# Patient Record
Sex: FEMALE | Race: WHITE
[De-identification: names, ages, dates, MRNs, and addresses within clinical notes are randomized per-mention and may not be internally consistent; named-entity substitution may affect disease eponyms.]

---

## 2017-11-30 ENCOUNTER — HOSPITAL ENCOUNTER (OUTPATIENT)
Dept: HOSPITAL 80 - FIMAGING | Age: 47
End: 2017-11-30
Attending: INTERNAL MEDICINE
Payer: COMMERCIAL

## 2017-11-30 DIAGNOSIS — Z12.31: Primary | ICD-10-CM

## 2017-11-30 DIAGNOSIS — Z80.3: ICD-10-CM

## 2018-11-28 ENCOUNTER — HOSPITAL ENCOUNTER (INPATIENT)
Dept: HOSPITAL 80 - FED | Age: 48
LOS: 7 days | Discharge: HOME HEALTH | DRG: 478 | End: 2018-12-05
Attending: INTERNAL MEDICINE | Admitting: INTERNAL MEDICINE
Payer: COMMERCIAL

## 2018-11-28 DIAGNOSIS — R50.9: ICD-10-CM

## 2018-11-28 DIAGNOSIS — E86.9: ICD-10-CM

## 2018-11-28 DIAGNOSIS — Z23: ICD-10-CM

## 2018-11-28 DIAGNOSIS — Z85.3: ICD-10-CM

## 2018-11-28 DIAGNOSIS — C79.51: ICD-10-CM

## 2018-11-28 DIAGNOSIS — G89.3: ICD-10-CM

## 2018-11-28 DIAGNOSIS — M84.552A: Primary | ICD-10-CM

## 2018-11-28 LAB
INR PPP: 1.05 (ref 0.83–1.16)
PLATELET # BLD: 374 10^3/UL (ref 150–400)
PROTHROMBIN TIME: 13.9 SEC (ref 12–15)

## 2018-11-28 PROCEDURE — G0378 HOSPITAL OBSERVATION PER HR: HCPCS

## 2018-11-28 PROCEDURE — A9585 GADOBUTROL INJECTION: HCPCS

## 2018-11-28 PROCEDURE — G0008 ADMIN INFLUENZA VIRUS VAC: HCPCS

## 2018-11-28 NOTE — EDPHY
General





- History


Smoking Status: Never smoked


Time Seen by Provider: 11/28/18 20:50


Narrative: 





CHIEF COMPLAINT: 


Rib cage pain, groin pain








HISTORY OF PRESENT ILLNESS: 


Patient presents to emergency department by private vehicle with her spouse 

with complaints of left rib pain and left groin pain.  Pain has been present 

for over 2 weeks.  She was doing exercises for left groin injury when she felt 

a sudden onset of pain in the muscles over her left side of her ribs.  It has 

been constant duration.  Moderate to severe at times, increasing severity over 

the past few days.  Worse with inspiration and palpation.  Also worse with 

movement at times.  She has no shortness of breath, cough, fever or any chest 

pain.  She has ongoing pain of the left groin as well.  She has been evaluated 

only by physical therapist without physician evaluation or imaging.  She has no 

extremity erythema edema or pain.  She has been taking Advil 400 mg 3-4 times 

daily with some improvement.  No history of venous thrombolic event.  No other 

associated complaints or modifying factors.





REVIEW OF SYSTEMS:


10 systems were reviewed and negative with the exception of the elements 

mentioned in the history of present illness.





PCP:


Dr. Radha Gerard





SPECIALISTS:


oncology, Dr. Iza Mays





PAST MEDICAL HISTORY: 


Ductal carcinoma in situ breast cancer.





PAST SURGICAL HISTORY:


Left mastectomy.





SOCIAL HISTORY:


Never smoker.  Lives independently with her spouse.





FAMILY HISTORY:


Noncontributory





EXAMINATION:


Vitals: Triage VS reviewed


General Appearance:  Alert, no distress.  Anxious.  Well-appearing.


Head: normocephalic, atraumatic


Eyes:  Pupils equal and round, no conjunctival pallor or injection


ENT, Mouth:  Mucous membranes moist


Neck:  Normal inspection, supple, non-tender


Respiratory:  Lungs are clear to auscultation.  Tenderness palpation on the 

left anterior axillary line over the lower ribs.


Cardiovascular:  Regular rate and rhythm


Gastrointestinal:  Abdomen is soft and nontender


Back: non-tender, no bony abnormalities


Neurological:  A&O, nonfocal, normal gait


Skin:  Warm and dry, no rash


Extremities:  Tenderness of the left greater trochanter left inguinal canal.  

There is no bony tenderness of the left ankle, heel, shin, knee.  Range of 

motion lower extremities symmetric painful in the left hip.  All compartments 

are soft the left lower extremity.


Psychiatric:  Mood and affect normal





DIFFERENTIAL DIAGNOSES:


Including but not limited to costochondritis, muscular strain, muscular strain, 

PE, pneumonitis, pathologic fracture, metastasis, pneumonia, pleurisy, 

pericarditis, hemothorax, pleural effusion








MDM:


8:50 p.m.


Left-sided reproducible rib pain and left groin and hip pain of 2 weeks 

duration.  Worsening over the past few days.  Pain does seem to be reproducible 

but is somewhat pleuritic.  Given her history of cancer and current use of 

tamoxifen, I discussed with Dr. Gomez, we will order D-dimer to rule out the 

possibility of PE.  Patient's spouse are comfortable this plan.  She is taking 

her own ibuprofen that she brought with her, 600 mg. She is taking her own 

Ativan that she brought with her, 1 mg by mouth.





9:55 p.m.


Chest x-ray as read by me reveals fluid collection versus patchy infiltrate on 

left portion of the chest.





10:00 p.m.


D-dimer is slightly elevated 0.53, thus I have ordered CT angiography of the 

chest.  I have re-evaluated the patient she has consented to the scan.





10:05 p.m.


Notified by radiologist Dr. Elmore.  There are patchy abnormalities of the 

chest bilaterally.  I notified him that we will be obtaining a CT scan of the 

chest for further evaluation.





10:10 p.m.


Notified by radiologist Dr. Elmore.  He notes some possible lytic lesions of 

the left proximal femur and possibly left acetabulum.  Recommend CT scan for 

further evaluation at this time.





10:45 p.m.


Notified by radiologist.  CT scans of the chest and pelvis were discussed.  

There is no PE but there are extensive metastatic changes as documented.  There 

is also a pathologic fracture of the left femoral neck.





11:13 p.m.


Case discussed with Dr. Cohn. he will admit the patient to his service.  

He request laboratory studies as I have ordered.  I will also consult 

Orthopedics.





12:05 a.m.


Orthopedic consultation pending.





12:15 a.m.


The orthopedist has been reached in the operating room.  They will return our 

call following the current case.





12:25 a.m.


Case discussed with Dr. Arechiga. He will provide consultation. No surgical 

intervention emergently.





SUPERVISION:


Patient was independently examined, but I discussed the case with my secondary 

supervising physician Dr. Gomez








CONSULTATION:


Hospitalist admission, Dr. Cohn


Orthopedics pending with Dr. Arechiga.


 (Max Matute)





- Diagnostics


Imaging Results: 


 Imaging Impressions





Chest X-Ray  11/28/18 21:13


Impression:


1. Bilateral mid and lower lobe interstitial opacities suggesting lymphangitic 

spread of tumor.


2. No pneumothorax.


3.  Fracture of indeterminate age left 6th rib mid axillary line.


 


Findings and recommendations discussed with Emergency Department Physician 

Assistant, Max Matute PA-C, at 2209 hours, on November 28, 2018.


 


Final report concurs with initial preliminary interpretation.








Hip X-Ray  11/28/18 21:13


Impression:


1.  Lytic lucencies involving the left acetabulum and proximal left femur, 

consistent with metastasis.


2. Left femoral neck fracture is only identified on the CT pelvis.


 


Findings and recommendations discussed with Emergency Department physician, 

Max Matute PA-C, at 2213 hours, on November 28, 2018.


 


Final report concurs with initial preliminary interpretation.








Chest/Thorax CTA  11/28/18 22:00


Impression:


1. Diffuse lytic osseous metastasis.


2. Bilateral diffuse lymphangitic spread of tumor.


3. No pulmonary embolus. 


 


 


 


Findings and recommendations discussed with Emergency Department physician, 

Max Matute PAC  at  22:40 hour, 11/28/2018.


 


Final report concurs with initial preliminary interpretation.


 


 








Pelvis CT  11/28/18 22:11


Impression:


1. Diffuse lytic osseous metastasis.


2. Pathologic fracture of the left femoral neck.


3. L5-S1 spondylolytic grade 1 spondylolisthesis. 


 


 


 


Findings and recommendations given to Max Matute PAC  at  22:50 hour, 11/28/ 2018.


 


Final report concurs with initial preliminary interpretation.














- Objective


Vital Signs: 


 Initial Vital Signs











Temperature (C)  98.4 F   11/28/18 20:22


 


Heart Rate  83   11/28/18 20:22


 


Respiratory Rate  20   11/28/18 20:22


 


Blood Pressure  163/101 H  11/28/18 20:22


 


O2 Sat (%)  98   11/28/18 20:22








 











O2 Delivery Mode               Room Air














Allergies/Adverse Reactions: 


 





No Known Allergies Allergy (Unverified 11/28/18 20:20)


 








Home Medications: 














 Medication  Instructions  Recorded


 


LORazepam [Ativan 0.5 mg (RX)] 0.5 mg PO DAILY 09/17/12


 


Tamoxifen Citrate  11/28/18











Laboratory Results: 


 Laboratory Results





 11/28/18 21:30 





 11/28/18 21:30 





 











  11/28/18 11/28/18 11/28/18





  21:35 21:30 21:30


 


WBC      10.65 10^3/uL H 10^3/uL





     (3.80-9.50) 


 


RBC      4.21 10^6/uL 10^6/uL





     (4.18-5.33) 


 


Hgb      13.0 g/dL g/dL





     (12.6-16.3) 


 


POC Hgb  12.9 gm/dL gm/dL    





   (12.6-16.3)   


 


Hct      36.6 % L %





     (38.0-47.0) 


 


POC Hct  38 % %    





   (38-47)   


 


MCV      86.9 fL fL





     (81.5-99.8) 


 


MCH      30.9 pg pg





     (27.9-34.1) 


 


MCHC      35.5 g/dL g/dL





     (32.4-36.7) 


 


RDW      11.9 % %





     (11.5-15.2) 


 


Plt Count      374 10^3/uL 10^3/uL





     (150-400) 


 


MPV      9.5 fL fL





     (8.7-11.7) 


 


Neut % (Auto)      73.5 % %





     (39.3-74.2) 


 


Lymph % (Auto)      18.2 % %





     (15.0-45.0) 


 


Mono % (Auto)      6.8 % %





     (4.5-13.0) 


 


Eos % (Auto)      0.8 % %





     (0.6-7.6) 


 


Baso % (Auto)      0.4 % %





     (0.3-1.7) 


 


Nucleat RBC Rel Count      0.0 % %





     (0.0-0.2) 


 


Absolute Neuts (auto)      7.83 10^3/uL H 10^3/uL





     (1.70-6.50) 


 


Absolute Lymphs (auto)      1.94 10^3/uL 10^3/uL





     (1.00-3.00) 


 


Absolute Monos (auto)      0.72 10^3/uL 10^3/uL





     (0.30-0.80) 


 


Absolute Eos (auto)      0.09 10^3/uL 10^3/uL





     (0.03-0.40) 


 


Absolute Basos (auto)      0.04 10^3/uL 10^3/uL





     (0.02-0.10) 


 


Absolute Nucleated RBC      0.00 10^3/uL 10^3/uL





     (0-0.01) 


 


Immature Gran %      0.3 % %





     (0.0-1.1) 


 


Immature Gran #      0.03 10^3/uL 10^3/uL





     (0.00-0.10) 


 


PT      





    


 


INR      





    


 


APTT      





    


 


D-Dimer      





    


 


POC Sodium  143 mEq/L mEq/L    





   (135-145)   


 


Sodium    139 mEq/L mEq/L  





    (135-145)  


 


POC Potassium  3.4 mEq/L mEq/L    





   (3.3-5.0)   


 


Potassium    3.7 mEq/L mEq/L  





    (3.3-5.0)  


 


POC Chloride  110 mEq/L mEq/L    





   ()   


 


Chloride    107 mEq/L mEq/L  





    ()  


 


Carbon Dioxide    20 mEq/l L mEq/l  





    (22-31)  


 


Anion Gap    12 mEq/L mEq/L  





    (6-14)  


 


POC BUN  16 mg/dL mg/dL    





   (7-23)   


 


BUN    17 mg/dL mg/dL  





    (7-23)  


 


Creatinine    0.8 mg/dL mg/dL  





    (0.6-1.0)  


 


POC Creatinine  0.8 mg/dL mg/dL    





   (0.6-1.0)   


 


Estimated GFR    > 60   





    


 


Glucose    127 mg/dL H mg/dL  





    ()  


 


POC Glucose  132 mg/dL H mg/dL    





   ()   


 


Calcium    10.4 mg/dL mg/dL  





    (8.5-10.4)  


 


Total Bilirubin    0.4 mg/dL mg/dL  





    (0.1-1.4)  


 


Conjugated Bilirubin    0.2 mg/dL mg/dL  





    (0.0-0.5)  


 


Unconjugated Bilirubin    0.2 mg/dL mg/dL  





    (0.0-1.1)  


 


AST    45 IU/L IU/L  





    (14-46)  


 


ALT    30 IU/L IU/L  





    (9-52)  


 


Alkaline Phosphatase    86 IU/L IU/L  





    ()  


 


Total Protein    8.4 g/dL H g/dL  





    (6.3-8.2)  


 


Albumin    4.9 g/dL g/dL  





    (3.5-5.0)  














  11/28/18





  21:30


 


WBC  





  


 


RBC  





  


 


Hgb  





  


 


POC Hgb  





  


 


Hct  





  


 


POC Hct  





  


 


MCV  





  


 


MCH  





  


 


MCHC  





  


 


RDW  





  


 


Plt Count  





  


 


MPV  





  


 


Neut % (Auto)  





  


 


Lymph % (Auto)  





  


 


Mono % (Auto)  





  


 


Eos % (Auto)  





  


 


Baso % (Auto)  





  


 


Nucleat RBC Rel Count  





  


 


Absolute Neuts (auto)  





  


 


Absolute Lymphs (auto)  





  


 


Absolute Monos (auto)  





  


 


Absolute Eos (auto)  





  


 


Absolute Basos (auto)  





  


 


Absolute Nucleated RBC  





  


 


Immature Gran %  





  


 


Immature Gran #  





  


 


PT  13.9 SEC SEC





   (12.0-15.0) 


 


INR  1.05 





   (0.83-1.16) 


 


APTT  28.1 SEC SEC





   (23.0-38.0) 


 


D-Dimer  0.53 ug/mLFEU H ug/mLFEU





   (0.00-0.50) 


 


POC Sodium  





  


 


Sodium  





  


 


POC Potassium  





  


 


Potassium  





  


 


POC Chloride  





  


 


Chloride  





  


 


Carbon Dioxide  





  


 


Anion Gap  





  


 


POC BUN  





  


 


BUN  





  


 


Creatinine  





  


 


POC Creatinine  





  


 


Estimated GFR  





  


 


Glucose  





  


 


POC Glucose  





  


 


Calcium  





  


 


Total Bilirubin  





  


 


Conjugated Bilirubin  





  


 


Unconjugated Bilirubin  





  


 


AST  





  


 


ALT  





  


 


Alkaline Phosphatase  





  


 


Total Protein  





  


 


Albumin  





  











Medications Given: 


 








Discontinued Medications





Sodium Chloride (Ns)  1,000 mls @ 0 mls/hr IV EDNOW ONE; Wide Open


   PRN Reason: Protocol


   Stop: 11/28/18 22:02


   Last Admin: 11/28/18 22:53 Dose:  1,000 mls





Point of Care Test Results: 


 Chemistry











  11/28/18





  21:35


 


POC Sodium  143 mEq/L mEq/L





   (135-145) 


 


POC Potassium  3.4 mEq/L mEq/L





   (3.3-5.0) 


 


POC Chloride  110 mEq/L mEq/L





   () 


 


POC BUN  16 mg/dL mg/dL





   (7-23) 


 


POC Creatinine  0.8 mg/dL mg/dL





   (0.6-1.0) 


 


POC Glucose  132 mg/dL H mg/dL





   () 








 ISTAT H&H











  11/28/18





  21:35


 


POC Hgb  12.9 gm/dL gm/dL





   (12.6-16.3) 


 


POC Hct  38 % %





   (38-47) 














Departure





- Departure


Disposition: [facility] Inpatient Acute


Clinical Impression: 


 Mass of left lung, Mass of right lung





Fracture of femoral neck, left, closed


Qualifiers:


 Encounter type: initial encounter Qualified Code(s): S72.002A - Fracture of 

unspecified part of neck of left femur, initial encounter for closed fracture





Condition: Fair

## 2018-11-29 LAB — PLATELET # BLD: 323 10^3/UL (ref 150–400)

## 2018-11-29 RX ADMIN — DEXTROSE MONOHYDRATE, SODIUM CHLORIDE, AND POTASSIUM CHLORIDE SCH MLS: 50; 4.5; 1.49 INJECTION, SOLUTION INTRAVENOUS at 00:58

## 2018-11-29 RX ADMIN — DEXTROSE MONOHYDRATE, SODIUM CHLORIDE, AND POTASSIUM CHLORIDE SCH MLS: 50; 4.5; 1.49 INJECTION, SOLUTION INTRAVENOUS at 10:29

## 2018-11-29 RX ADMIN — IBUPROFEN PRN MG: 600 TABLET ORAL at 08:14

## 2018-11-29 RX ADMIN — IBUPROFEN PRN MG: 600 TABLET ORAL at 14:15

## 2018-11-29 NOTE — HOSPPROG
Hospitalist Progress Note


Assessment/Plan: 


47 yo F w/ hx of breast CA presents with L hip pain and found to have 

widespread metastases and pathologic L hip fracture.





Plan: 


1. Breast CA, recurrent - Presented with various MSK complaints; imaging 

revealed widespread osseous metastases and likely lymphangitic spread in the 

lungs. She was initially diagnosed with DCIS in 2009 and treated with 

lumpectomy and radiation. She had Stage I disease recurrence in 2012 treated 

with mastectomy and chemotherapy. She follows with Dr. Iza Mays.


- Oncology consulted and following 


- Morphine PRN for pain control, Ativan PRN for anxiety





2. Pathologic L femoral neck fracture, acute - L hip pain was her presenting 

symptom. This will likely be a complex situation noting the extent of malignant 

involvement.


- Orthopedic surgery (Dr. Arechiga) consulted who recommends Orthopaedic Oncology 

consult


- Dr. Espinosa of Oncology spoke with Dr. Hernandez, Orthopaedic Oncologist at , 

who recommends pain control and seeing how mobile patient is, if she is unable 

to mobilize or has intractable pain he recommends transfer to  for further 

evaluation, however he believes patient would likely require total hip 

replacement and with her cancer burden he believes the morbidity from the 

procedure would be high


- Pain control as above 





Diet - Regular


Code - Full


Ppx - SCDs pending surgical evaluation, high risk for VTE so would commence 

LMWH afterwards





Dispo - Pending clinical course 





Objective: 


 Vital Signs











Temp Pulse Resp BP Pulse Ox


 


 36.5 C   79   14   115/72   96 


 


 11/29/18 15:26  11/29/18 15:26  11/29/18 15:26  11/29/18 15:26  11/29/18 15:26








 Laboratory Results





 11/29/18 05:35 





 11/29/18 05:35 





 











 11/28/18 11/29/18 11/30/18





 05:59 05:59 05:59


 


Intake Total  452 950


 


Output Total   1450


 


Balance  452 -500








 











PT  13.9 SEC (12.0-15.0)   11/28/18  21:30    


 


INR  1.05  (0.83-1.16)   11/28/18  21:30    














- Physical Exam


Constitutional: no apparent distress


Eyes: PERRL


Ears, Nose, Mouth, Throat: moist mucous membranes


Cardiovascular: regular rate and rhythym


Respiratory: no respiratory distress


Gastrointestinal: soft, non-tender abdomen


Skin: warm


Musculoskeletal: pain with ROM


Neurologic: AAOx3


Psychiatric: interacting appropriately





ICD10 Worksheet


Patient Problems: 


 Problems











Problem Status Onset


 


Fracture of femoral neck, left, closed Acute  


 


Mass of left lung Acute  


 


Mass of right lung Acute

## 2018-11-29 NOTE — PDMN
Medical Necessity


Medical necessity: MCG: [facility] Oncology  HX of Br. Ca with new Dg diffuse osseous 

metastatic disease- new pathologic fx of hip with major instability - pt with 

ortho consults , onc consult, PT, OT, ongoing monitoring, eval and tx  status 

changed to INPT 11/29/19

## 2018-11-29 NOTE — GCON
DATE OF CONSULTATION:  11/28/2018



REASON FOR CONSULTATION:  Left hip pain.



HISTORY RELATIVE TO CONSULTATION:  The patient is a 48-year-old with a history of breast CA who has h
ad a progressive onset of left hip pain over the last 6 months.  She was just on a prolonged internat
ional vacation that involved lots of walking and has had exacerbation of her symptoms over the last c
ouple of weeks.  Her pain is currently limiting her from doing some walking.  She started using a can
e for ambulation.



PHYSICAL EXAMINATION:  MUSCULOSKELETAL: There is no focal deformity with her hip.  Her leg lengths ar
e symmetric with symmetrical rotation.  Gentle hip flexion and extension does not produce any signifi
cant pain but with more loaded motion, pain is reproduced in the groin.



IMAGING DATA:  The images including AP radiographs of her hip and CT scan were reviewed.  There is ex
tensive metastatic disease throughout her pelvis, spine, and femur.  Most notably in the left femoral
 head, there is a large lytic lesion which appears to extend out the periphery of the head.



ASSESSMENT:  Left pathologic femoral head fracture.



PLAN:  Based on the extensive nature of the lesion in her femoral head, from a treatment standpoint, 
a total hip arthroplasty is the likely preferable option.  Based on her extensive metastatic disease,
 it was recommended that she be managed by an orthopedic oncologist.  She was given contact informati
on for Dr. Shaun Tatum at the [facility] and will call tomorrow to set up an appoint
ment.  She will continue crutch ambulation and may be discharged when her pain level allows.





Job #:  [id number]

## 2018-11-29 NOTE — PDGENHP
History and Physical





- Chief Complaint


L hip pain, chest pain





- History of Present Illness


47 yo F w/ hx of breast CA presents with L hip pain and chest pain. The patient 

has been having occasional musculoskeletal pains for several months. Over the 

last 3 weeks, L hip pain has led to difficulty ambulating. Over the last few 

days she has also been noticing anterior chest wall pain. She presented to the 

ED today for evaluation. In the ED CT scans were notable for diffuse osseous 

metastases including a possible pathologic fracture of the L femoral neck. 





She was initially diagnosed with DCIS in 2009 and treated with lumpectomy and 

radiation at that time. She had Stage I disease recurrence in 2012 and was 

treated with mastectomy and chemotherapy. She has been on Tamoxifen since. She 

followed with Dr. Iza Mays.





Case discussed with ED physician Dr. Coello; records reviewed and summarized 

above.





History Information





- Allergies/Home Medication List


Allergies/Adverse Reactions: 








No Known Allergies Allergy (Unverified 11/28/18 20:20)


 





Home Medications: 








LORazepam [Ativan 0.5 mg (RX)] 0.5 mg PO DAILY 09/17/12 [Last Taken Unknown]


Tamoxifen Citrate  11/28/18 [Last Taken Unknown]





I have personally reviewed and updated: family history, medical history





- Past Medical History


cancer





- Surgical History


Reports: cancer surgery (Lumpectomy 2009, mastectomy 2012)





- Family History


Negative for: cancer





- Social History


Smoking Status: Never smoked





Review of Systems


Review of Systems: 





ROS: 10pt was reviewed & negative except for what was stated in HPI & below





Physical Exam


Physical Exam: 

















Temp Pulse Resp BP Pulse Ox


 


 36.9 C   64   16   128/75 H  95 


 


 11/28/18 20:22  11/28/18 22:56  11/28/18 22:56  11/28/18 22:56  11/28/18 22:56











Constitutional: no apparent distress, appears nourished


Eyes: PERRL, EOMI


Ears, Nose, Mouth, Throat: moist mucous membranes, no oral mucosal ulcers


Cardiovascular: regular rate and rhythym, no murmur, rub, or gallop


Respiratory: no respiratory distress, clear to auscultation


Gastrointestinal: normoactive bowel sounds, soft, non-tender abdomen


Skin: warm, normal color


Musculoskeletal: full muscle strength, pain with ROM (L hip)


Neurologic: AAOx3, CN II-XII Intact


Psychiatric: interacting appropriately, anxious





Lab Data & Imaging Review





 11/28/18 21:30





 11/28/18 21:30














WBC  10.65 10^3/uL (3.80-9.50)  H  11/28/18  21:30    


 


RBC  4.21 10^6/uL (4.18-5.33)   11/28/18  21:30    


 


Hgb  13.0 g/dL (12.6-16.3)   11/28/18  21:30    


 


POC Hgb  12.9 gm/dL (12.6-16.3)   11/28/18  21:35    


 


Hct  36.6 % (38.0-47.0)  L  11/28/18  21:30    


 


POC Hct  38 % (38-47)   11/28/18  21:35    


 


MCV  86.9 fL (81.5-99.8)   11/28/18  21:30    


 


MCH  30.9 pg (27.9-34.1)   11/28/18  21:30    


 


MCHC  35.5 g/dL (32.4-36.7)   11/28/18  21:30    


 


RDW  11.9 % (11.5-15.2)   11/28/18  21:30    


 


Plt Count  374 10^3/uL (150-400)   11/28/18  21:30    


 


MPV  9.5 fL (8.7-11.7)   11/28/18  21:30    


 


Neut % (Auto)  73.5 % (39.3-74.2)   11/28/18  21:30    


 


Lymph % (Auto)  18.2 % (15.0-45.0)   11/28/18  21:30    


 


Mono % (Auto)  6.8 % (4.5-13.0)   11/28/18  21:30    


 


Eos % (Auto)  0.8 % (0.6-7.6)   11/28/18  21:30    


 


Baso % (Auto)  0.4 % (0.3-1.7)   11/28/18 21:30    


 


Nucleat RBC Rel Count  0.0 % (0.0-0.2)   11/28/18  21:30    


 


Absolute Neuts (auto)  7.83 10^3/uL (1.70-6.50)  H  11/28/18  21:30    


 


Absolute Lymphs (auto)  1.94 10^3/uL (1.00-3.00)   11/28/18  21:30    


 


Absolute Monos (auto)  0.72 10^3/uL (0.30-0.80)   11/28/18  21:30    


 


Absolute Eos (auto)  0.09 10^3/uL (0.03-0.40)   11/28/18  21:30    


 


Absolute Basos (auto)  0.04 10^3/uL (0.02-0.10)   11/28/18  21:30    


 


Absolute Nucleated RBC  0.00 10^3/uL (0-0.01)   11/28/18  21:30    


 


Immature Gran %  0.3 % (0.0-1.1)   11/28/18  21:30    


 


Immature Gran #  0.03 10^3/uL (0.00-0.10)   11/28/18  21:30    


 


PT  13.9 SEC (12.0-15.0)   11/28/18  21:30    


 


INR  1.05  (0.83-1.16)   11/28/18  21:30    


 


APTT  28.1 SEC (23.0-38.0)   11/28/18  21:30    


 


D-Dimer  0.53 ug/mLFEU (0.00-0.50)  H  11/28/18  21:30    


 


POC Sodium  143 mEq/L (135-145)   11/28/18  21:35    


 


Sodium  139 mEq/L (135-145)   11/28/18  21:30    


 


POC Potassium  3.4 mEq/L (3.3-5.0)   11/28/18  21:35    


 


Potassium  3.7 mEq/L (3.3-5.0)   11/28/18  21:30    


 


POC Chloride  110 mEq/L ()   11/28/18  21:35    


 


Chloride  107 mEq/L ()   11/28/18  21:30    


 


Carbon Dioxide  20 mEq/l (22-31)  L  11/28/18  21:30    


 


Anion Gap  12 mEq/L (6-14)   11/28/18  21:30    


 


POC BUN  16 mg/dL (7-23)   11/28/18  21:35    


 


BUN  17 mg/dL (7-23)   11/28/18  21:30    


 


Creatinine  0.8 mg/dL (0.6-1.0)   11/28/18  21:30    


 


POC Creatinine  0.8 mg/dL (0.6-1.0)   11/28/18  21:35    


 


Estimated GFR  > 60   11/28/18  21:30    


 


Glucose  127 mg/dL ()  H  11/28/18  21:30    


 


POC Glucose  132 mg/dL ()  H  11/28/18  21:35    


 


Calcium  10.4 mg/dL (8.5-10.4)   11/28/18  21:30    


 


Total Bilirubin  0.4 mg/dL (0.1-1.4)   11/28/18  21:30    


 


Conjugated Bilirubin  0.2 mg/dL (0.0-0.5)   11/28/18  21:30    


 


Unconjugated Bilirubin  0.2 mg/dL (0.0-1.1)   11/28/18  21:30    


 


AST  45 IU/L (14-46)   11/28/18  21:30    


 


ALT  30 IU/L (9-52)   11/28/18  21:30    


 


Alkaline Phosphatase  86 IU/L ()   11/28/18  21:30    


 


Total Protein  8.4 g/dL (6.3-8.2)  H  11/28/18  21:30    


 


Albumin  4.9 g/dL (3.5-5.0)   11/28/18  21:30    








Imaging Review: 





 Imaging Impressions





Chest X-Ray  11/28/18 21:13


Impression:


1. Bilateral mid and lower lobe interstitial opacities suggesting lymphangitic 

spread of tumor.


2. No pneumothorax.


3.  Fracture of indeterminate age left 6th rib mid axillary line.


 


Findings and recommendations discussed with Emergency Department Physician 

Assistant, Max Matute PA-C, at 2209 hours, on November 28, 2018.


 


Final report concurs with initial preliminary interpretation.








Hip X-Ray  11/28/18 21:13


Impression:


1.  Lytic lucencies involving the left acetabulum and proximal left femur, 

consistent with metastasis.


2. Left femoral neck fracture is only identified on the CT pelvis.


 


Findings and recommendations discussed with Emergency Department physician, 

Max Matute PA-C, at 2213 hours, on November 28, 2018.


 


Final report concurs with initial preliminary interpretation.








Chest/Thorax CTA  11/28/18 22:00


Impression:


1. Diffuse lytic osseous metastasis.


2. Bilateral diffuse lymphangitic spread of tumor.


3. No pulmonary embolus. 


 


 


 


Findings and recommendations discussed with Emergency Department physician, 

Max Matute PAC  at  22:40 hour, 11/28/2018.


 


Final report concurs with initial preliminary interpretation.


 


 








Pelvis CT  11/28/18 22:11


Impression:


1. Diffuse lytic osseous metastasis.


2. Pathologic fracture of the left femoral neck.


3. L5-S1 spondylolytic grade 1 spondylolisthesis. 


 


 


 


Findings and recommendations given to Max Matute PAC  at  22:50 hour, 11/28/ 2018.


 


Final report concurs with initial preliminary interpretation.














Assessment & Plan


Assessment: 








47 yo F w/ hx of breast CA presents with L hip pain and found to have 

widespread metastases and pathologic L hip fracture.








Plan: 


1. Breast CA, recurrent - Presented with various MSK complaints; imaging 

revealed widespread osseous metastases and likely lymphangitic spread in the 

lungs. She was initially diagnosed with DCIS in 2009 and treated with 

lumpectomy and radiation. She had Stage I disease recurrence in 2012 treated 

with mastectomy and chemotherapy. She follows with Dr. Iza Mays.


- Admit for observation


- Oncology consult in the morning


- Morphine PRN for pain control, Ativan PRN for anxiety


- May benefit from palliative involvement as well


2. Pathologic L femoral neck fracture, acute - L hip pain was her presenting 

symptom. This will likely be a complex situation noting the extent of malignant 

involvement.


- Orthopedic surgery (Dr. Arechiga) consulted


- Will maintain NPO pending surgical evaluation





Diet - NPO pending surgical evaluation


Code - Full


Ppx - SCDs pending surgical evaluation, high risk for VTE so would commence 

LMWH afterwards


Dispo - Admit under observation status

## 2018-11-30 RX ADMIN — IBUPROFEN PRN MG: 600 TABLET ORAL at 03:18

## 2018-11-30 RX ADMIN — IBUPROFEN PRN MG: 600 TABLET ORAL at 09:32

## 2018-11-30 RX ADMIN — IBUPROFEN PRN MG: 600 TABLET ORAL at 21:39

## 2018-11-30 RX ADMIN — IBUPROFEN PRN MG: 600 TABLET ORAL at 15:48

## 2018-11-30 NOTE — SOAPPROG
SOAP Progress Note


Assessment/Plan: 


Assessment/Plan: 48 woman w hx of T1cN0 ER+CO-HER2 neg breast cancer in 2012 s/

p L mastectomy and SLNB


This was followed by TC x 6 and she has been on tamoxifen since that time


She presented w L hip pain discovered to have femoral neck fracture w diffuse 

osseous metastatic disease, presumably metastatic breast ca


1. L fem neck fracture - not amenable to surgery at this time given extensive 

lytic lesions


D/W DR Hernandez at [facility] - would requite total hip arthroplasty which could be quite 

morbid for her and no guarantee of helping; would like to hold off on surgery 

at this time so we can get system therapy started


Pain controlled very well and can ambulate w walker


pelvic plain film complete - will need to see DR Hernandez as outpt


as a means to achieve some bone reconstitution, consulted RadOnc - was 

simulated and given high fraction today (thnk one treatment only planned but 

did not see Dr Perry's note)


Can probably safely get Zometa tomorrow prior to discharge


PT/OT


2. Diffuse metastatic dz - mets to lungs (lymphangitic spread?), liver (3 mets) 

and skeleton


soft tissue dx recommended for definitive path and molecular testing


Have requested CT guided IR bx for tomorrow


CA 27-29 pending


If ER+ breast cancer, treatment of choice would be first line AI or fulvestrant 

w Ck4/6 inhibitor + bisphosphonate


Gave her re-assurance today


3. Pain - home w oxycodone





more than 45 min spent w pt and family








11/30/18 17:01





Subjective: 





No acute events


XRT today


Pain controlled


Objective: 





 Vital Signs











Temp Pulse Resp BP Pulse Ox


 


 37.1 C   98   16   125/74 H  97 


 


 11/30/18 14:35  11/30/18 14:35  11/30/18 14:35  11/30/18 14:35  11/30/18 14:35








 











 11/29/18 11/30/18 12/01/18





 05:59 05:59 05:59


 


Intake Total  3497 565


 


Output Total  2300 2250


 


Balance  237 -1685








 











PT  13.9 SEC (12.0-15.0)   11/28/18  21:30    


 


INR  1.05  (0.83-1.16)   11/28/18  21:30    








Sitting in chair


Not examined today





ICD10 Worksheet


Patient Problems: 


 Problems











Problem Status Onset


 


Fracture of femoral neck, left, closed Acute  


 


Mass of left lung Acute  


 


Mass of right lung Acute

## 2018-11-30 NOTE — HOSPPROG
Hospitalist Progress Note


Assessment/Plan: 


47 yo F w/ hx of breast CA presents with L hip pain and found to have 

widespread metastases and pathologic L hip fracture.





Plan: 


1. Breast CA, recurrent - Presented with various MSK complaints; imaging 

revealed widespread osseous metastases and likely lymphangitic spread in the 

lungs. She was initially diagnosed with DCIS in 2009 and treated with 

lumpectomy and radiation. She had Stage I disease recurrence in 2012 treated 

with mastectomy and chemotherapy. She follows with Dr. Iza Mays.


- Oncology consulted and following 


- Morphine PRN for pain control, Ativan PRN for anxiety


- Abd CT shows liver, lungs, thoracolumbar metastasis


- Oncology recommending tissue biopsy, considering Liver Lesion biopsy, will 

make NPO at midnight





2. Pathologic L femoral neck fracture, acute - L hip pain was her presenting 

symptom. This will likely be a complex situation noting the extent of malignant 

involvement.


- Orthopedic surgery (Dr. Arechiga) consulted who recommends Orthopaedic Oncology 

consult


- Dr. Espinosa of Oncology spoke with Dr. Hernandez, Orthopaedic Oncologist at , 

who recommends pain control and seeing how mobile patient is, if she is unable 

to mobilize or has intractable pain he recommends transfer to  for further 

evaluation, however he believes patient would likely require total hip 

replacement and with her cancer burden he believes the morbidity from the 

procedure would be high


- Pain control as above 





Diet - Regular


Code - Full


Ppx - SCDs pending surgical evaluation, high risk for VTE so would commence 

LMWH afterwards





Dispo - Pending clinical course, likely d/c tomorrow 





Subjective: Patient reports some pain in LLE today


Objective: 


 Vital Signs











Temp Pulse Resp BP Pulse Ox


 


 37.1 C   98   16   125/74 H  97 


 


 11/30/18 14:35  11/30/18 14:35  11/30/18 14:35  11/30/18 14:35  11/30/18 14:35








 











 11/29/18 11/30/18 12/01/18





 05:59 05:59 05:59


 


Intake Total  2537 565


 


Output Total  2300 1300


 


Balance  237 -735








 











PT  13.9 SEC (12.0-15.0)   11/28/18  21:30    


 


INR  1.05  (0.83-1.16)   11/28/18  21:30    














- Physical Exam


Constitutional: uncomfortable


Eyes: PERRL


Ears, Nose, Mouth, Throat: moist mucous membranes


Cardiovascular: regular rate and rhythym


Respiratory: no respiratory distress


Gastrointestinal: soft, non-tender abdomen


Genitourinary: No bedoya in urethra


Musculoskeletal: pain with ROM


Neurologic: AAOx3


Psychiatric: interacting appropriately





ICD10 Worksheet


Patient Problems: 


 Problems











Problem Status Onset


 


Fracture of femoral neck, left, closed Acute  


 


Mass of left lung Acute  


 


Mass of right lung Acute

## 2018-11-30 NOTE — ASMTCMCOM
CM Note

 

CM Note                       

Notes:

Pt admitted for metastatic cancer to bone with femur fracture. PT and OT recommending home 

care. Palliative eval orders placed today at end of day. Pt agreeable to home therapy through [facility] 

and they are able to accept. 



CM also mentioned that palliative orders had been placed and pt became tearful. CM did some 

appropriate education on the function of palliative care and told pt that  would be 

available to chat with her over the weekend about what palliative care is and how it may be helpful 


and what agencies are available. 



Family and friends were in the room and expressed overwhelm at number of decisions to be made. CM 

explained that decisions regarding support were only needed in the timing that was comfortable for 

the pt, and that the RN Navigator was a great resource when they were ready. It may be appropriate 

for  to touch base with pt over the weekend still for support and listening. 



D/C Plan: Home with [facility] PT/OT

 

Date Signed:  11/30/2018 04:27 PM

Electronically Signed By:Oksana Olivera

## 2018-12-01 RX ADMIN — IBUPROFEN PRN MG: 600 TABLET ORAL at 06:20

## 2018-12-01 RX ADMIN — DEXTROSE MONOHYDRATE, SODIUM CHLORIDE, AND POTASSIUM CHLORIDE SCH MLS: 50; 4.5; 1.49 INJECTION, SOLUTION INTRAVENOUS at 12:10

## 2018-12-01 RX ADMIN — IBUPROFEN PRN MG: 600 TABLET ORAL at 12:16

## 2018-12-01 RX ADMIN — IBUPROFEN PRN MG: 600 TABLET ORAL at 17:49

## 2018-12-01 NOTE — HOSPPROG
Hospitalist Progress Note


Assessment/Plan: 


47 yo F w/ hx of breast CA presents with L hip pain and found to have 

widespread metastases and pathologic L hip fracture.





Plan: 


1. Breast CA, recurrent - Presented with various MSK complaints; imaging 

revealed widespread osseous metastases and likely lymphangitic spread in the 

lungs. She was initially diagnosed with DCIS in 2009 and treated with 

lumpectomy and radiation. She had Stage I disease recurrence in 2012 treated 

with mastectomy and chemotherapy. She follows with Dr. Iza Mays.


- Oncology consulted and following 


- Morphine PRN for pain control, Ativan PRN for anxiety


- Abd CT shows liver, lungs, thoracolumbar metastasis


- Oncology recommending liver lesion biopsy, ordered for today but unable to be 

done per IR, will either perform as IP vs. OP on Monday 





2. Pathologic L femoral neck fracture, acute - L hip pain was her presenting 

symptom. This will likely be a complex situation noting the extent of malignant 

involvement.


- Orthopedic surgery (Dr. Arechiga) consulted who recommends Orthopaedic Oncology 

consult, patient to f/u with Dr. Hernandez, Orthopaedic Oncologist at , with 

repeat imaging as an outpatient 


- Pain control as above 





Diet - Regular


Code - Full


Ppx - SCDs pending surgical evaluation, high risk for VTE so would commence 

LMWH afterwards





Dispo - Pending clinical course





Subjective: Patient reports mild pain in LLE this AM


Objective: 


 Vital Signs











Temp Pulse Resp BP Pulse Ox


 


 36.9 C   68   14   130/77 H  95 


 


 12/01/18 11:09  12/01/18 11:09  12/01/18 11:09  12/01/18 11:09  12/01/18 11:09








 











 11/30/18 12/01/18 12/02/18





 05:59 05:59 05:59


 


Intake Total 2537 3205 


 


Output Total 2300 4500 


 


Balance 237 -1295 








 











PT  13.9 SEC (12.0-15.0)   11/28/18  21:30    


 


INR  1.05  (0.83-1.16)   11/28/18  21:30    














- Physical Exam


Constitutional: no apparent distress


Eyes: PERRL


Ears, Nose, Mouth, Throat: moist mucous membranes


Cardiovascular: No edema


Respiratory: no respiratory distress


Gastrointestinal: No distension


Musculoskeletal: pain with ROM


Neurologic: AAOx3


Psychiatric: interacting appropriately





ICD10 Worksheet


Patient Problems: 


 Problems











Problem Status Onset


 


Fracture of femoral neck, left, closed Acute  


 


Mass of left lung Acute  


 


Mass of right lung Acute

## 2018-12-01 NOTE — SOAPPROG
SOAP Progress Note


Assessment/Plan: 


Assessment:





SOAP Progress Note


Assessment/Plan: 


Assessment/Plan: 48 woman w hx of T1cN0 ER+NM-HER2 neg breast cancer in 2012 s/

p L mastectomy and SLNB


This was followed by TC x 6 and she has been on tamoxifen since that time


She presented w L hip pain discovered to have femoral neck fracture w diffuse 

osseous metastatic disease, presumably metastatic breast ca


1. L fem neck fracture - not amenable to surgery at this time given extensive 

lytic lesions


Pain controlled. Can ambulate w walker (non weight bearing) will need to see DR Hernandez as outpt


Was simulated and given one high fraction yesterday per Dr. Perry. 


Zometa today. 


PT/OT


2. Diffuse metastatic dz - mets to lungs (lymphangitic spread?), liver (3 mets) 

and skeleton


soft tissue dx recommended for definitive path and molecular testing


 CT guided IR bx of liver lesion. 


CA 27-29 elevated consistent with metastatic breast cancer.


If ER+ breast cancer, treatment of choice would be first line AI or fulvestrant 

w Ck4/6 inhibitor + bisphosphonate.





3. Pain - home w oxycodone











Subjective: 





comfortable, intermittently tearful


Objective: 





 Vital Signs











Temp Pulse Resp BP Pulse Ox


 


 36.9 C   68   14   130/77 H  95 


 


 12/01/18 11:09  12/01/18 11:09 12/01/18 11:09 12/01/18 11:09 12/01/18 11:09 11/30/18 12/01/18 12/02/18





 05:59 05:59 05:59


 


Intake Total 2537 3205 


 


Output Total 2300 4500 


 


Balance 237 -1295 








 











PT  13.9 SEC (12.0-15.0)   11/28/18  21:30    


 


INR  1.05  (0.83-1.16)   11/28/18  21:30    














Physical Exam





- Physical Exam


General Appearance: no apparent distress





ICD10 Worksheet


Patient Problems: 


 Problems











Problem Status Onset


 


Fracture of femoral neck, left, closed Acute  


 


Mass of left lung Acute  


 


Mass of right lung Acute

## 2018-12-02 LAB — PLATELET # BLD: 322 10^3/UL (ref 150–400)

## 2018-12-02 RX ADMIN — AZITHROMYCIN MONOHYDRATE SCH MLS: 500 INJECTION, POWDER, LYOPHILIZED, FOR SOLUTION INTRAVENOUS at 21:16

## 2018-12-02 RX ADMIN — IBUPROFEN PRN MG: 600 TABLET ORAL at 10:14

## 2018-12-02 RX ADMIN — DEXTROSE MONOHYDRATE, SODIUM CHLORIDE, AND POTASSIUM CHLORIDE SCH MLS: 50; 4.5; 1.49 INJECTION, SOLUTION INTRAVENOUS at 05:25

## 2018-12-02 RX ADMIN — IBUPROFEN PRN MG: 600 TABLET ORAL at 18:33

## 2018-12-02 NOTE — HOSPPROG
Hospitalist Progress Note


Assessment/Plan: 


49 yo F w/ hx of breast CA presents with L hip pain and found to have 

widespread metastases and pathologic L hip fracture.





Plan: 


1. Breast CA, recurrent - Presented with various MSK complaints; imaging 

revealed widespread osseous metastases and likely lymphangitic spread in the 

lungs. She was initially diagnosed with DCIS in 2009 and treated with 

lumpectomy and radiation. She had Stage I disease recurrence in 2012 treated 

with mastectomy and chemotherapy. She follows with Dr. Iza Mays.


- Oncology consulted and following 


- Morphine PRN for pain control, Ativan PRN for anxiety


- Abd CT shows liver, lungs, thoracolumbar metastasis


- MRI Brain ordered for today


- Bone biopsy of L ilium ordered for tomorrow for tissue diagnosis per Onc 


- Lupron planned for tomorrow


- PET as an outpatient 





2. Pathologic L femoral neck fracture, acute - L hip pain was her presenting 

symptom. This will likely be a complex situation noting the extent of malignant 

involvement.


- Orthopedic surgery (Dr. Arechiga) consulted who recommends Orthopaedic Oncology 

consult, patient to f/u with Dr. Hernandez, Orthopaedic Oncologist at , with 

repeat imaging as an outpatient 


- Pain control as above 


- Was simulated and given one high fraction on Friday by Dr. Perry


- S/p Zometa yesterday per Onc





3. Fever 


- T max 38.2 overnight with slight tachycardia


- CXR performed which showed worsening pneumonitis, potentially viral


- Afebrile so far today, HR improved


- Will hold off on abx for now, continue to monitor, if spikes another fever, 

will start empiric abx





Diet - Regular


Code - Full


Ppx - SCDs pending surgical evaluation, high risk for VTE so would commence 

LMWH afterwards





Dispo - Pending clinical course





Subjective: Patient reports some pain in LLE


Objective: 


 Vital Signs











Temp Pulse Resp BP Pulse Ox


 


 36.8 C   93   14   109/66   92 


 


 12/02/18 08:00  12/02/18 08:00  12/02/18 08:00  12/02/18 08:00  12/02/18 08:00








 Laboratory Results





 12/02/18 05:51 





 











 12/01/18 12/02/18 12/03/18





 05:59 05:59 05:59


 


Intake Total 3205 1550 


 


Output Total 4500 1700 


 


Balance -1295 -150 








 











PT  13.9 SEC (12.0-15.0)   11/28/18  21:30    


 


INR  1.05  (0.83-1.16)   11/28/18  21:30    














- Physical Exam


Constitutional: no apparent distress


Eyes: PERRL


Ears, Nose, Mouth, Throat: moist mucous membranes


Cardiovascular: regular rate and rhythym


Respiratory: no respiratory distress


Skin: warm


Musculoskeletal: pain with ROM


Neurologic: AAOx3


Psychiatric: interacting appropriately





ICD10 Worksheet


Patient Problems: 


 Problems











Problem Status Onset


 


Fracture of femoral neck, left, closed Acute  


 


Mass of left lung Acute  


 


Mass of right lung Acute

## 2018-12-02 NOTE — SOAPPROG
SOAP Progress Note


Assessment/Plan: 


Assessment:





SOAP Progress Note


Assessment/Plan: 


Assessment/Plan: 48 woman w hx of T1cN0 ER+MD-HER2 neg breast cancer in 2012 s/

p L mastectomy and SLNB


This was followed by TC x 6 and she has been on tamoxifen since that time


She presented w L hip pain discovered to have femoral neck fracture w diffuse 

osseous metastatic disease, presumably metastatic breast ca





1. L fem neck fracture - not amenable to surgery at this time given extensive 

lytic lesions


Pain controlled. Can ambulate w walker (non weight bearing) will need to see DR Hernandez as outpt


Was simulated and given one high fraction on Friday by Dr. Perry. 





2. Left rib pain-films reviewed with Dr. doe. Has bone mets in lateral 8, 10

, 11 ribs. Suspect radiation would be of benefit. Will discuss with Dr. Perry. 





2. Diffuse metastatic dz - mets to lungs (lymphangitic spread?), liver (3 mets) 

and skeleton


soft tissue dx recommended for definitive path and molecular testing. Reviewed 

CT scans today with Dr. Doe. Liver biopsy is challenging, lesion in the 

dome of the liver. Easily accessible lesion in left ileum with prominent soft 

tissue component. Preference is to obtain soft tissue sample (as opposed to bone

) due to potential issues with false neg results on immunostains with the 

decalcification process. Will discuss with pathology. Will send for foundation 

testing, along with ER, MD and Her2. Received Zometa on 12/1. Treatment will 

likely be antiestrogen therapy with CDK inhibitor. Chemotherapy would be a 

consideration if lung disease proves to be lymphangitic and has progressive 

resp compromise. Will get brain MRI today, PET as an outpatient. Lupron tomorrow

, anticipating probable antiestrogen approach. 





3. Low grade fever last night-unclear source. Can sometimes see after zometa. 

CXR with increased markings suggestive of viral/atypical pneumonitis. Will hold 

on antibiotics for now. patient cultured.





4. ? Lymphangitic disease-CT/CXR's reviewed with Dr. Doe. He favors more 

viral process. Difficult to know for sure. Not hypoxic. Will monitor for now. 























12/02/18 10:11





Subjective: 





left ribs are painful. Limits movement.


Appropriately intermittently tearful


Objective: 





 Vital Signs











Temp Pulse Resp BP Pulse Ox


 


 36.8 C   93   14   109/66   92 


 


 12/02/18 08:00  12/02/18 08:00  12/02/18 08:00  12/02/18 08:00  12/02/18 08:00








 Laboratory Results





 12/02/18 05:51 





 











 12/01/18 12/02/18 12/03/18





 05:59 05:59 05:59


 


Intake Total 3205 1550 


 


Output Total 4500 1700 


 


Balance -1295 -150 








 











PT  13.9 SEC (12.0-15.0)   11/28/18  21:30    


 


INR  1.05  (0.83-1.16)   11/28/18  21:30    














Physical Exam





- Physical Exam


General Appearance: alert, mild distress


Respiratory: lungs clear


Abdomen: non-tender, soft


Lymphatic: no adenopathy


Neuro/Psych: alert





ICD10 Worksheet


Patient Problems: 


 Problems











Problem Status Onset


 


Fracture of femoral neck, left, closed Acute  


 


Mass of left lung Acute  


 


Mass of right lung Acute

## 2018-12-03 LAB — PLATELET # BLD: 246 10^3/UL (ref 150–400)

## 2018-12-03 PROCEDURE — 0QB33ZX EXCISION OF LEFT PELVIC BONE, PERCUTANEOUS APPROACH, DIAGNOSTIC: ICD-10-PCS | Performed by: GENERAL ACUTE CARE HOSPITAL

## 2018-12-03 RX ADMIN — DEXTROSE MONOHYDRATE, SODIUM CHLORIDE, AND POTASSIUM CHLORIDE SCH MLS: 50; 4.5; 1.49 INJECTION, SOLUTION INTRAVENOUS at 07:04

## 2018-12-03 RX ADMIN — AZITHROMYCIN MONOHYDRATE SCH MLS: 500 INJECTION, POWDER, LYOPHILIZED, FOR SOLUTION INTRAVENOUS at 09:31

## 2018-12-03 RX ADMIN — ENOXAPARIN SODIUM SCH MG: 100 INJECTION SUBCUTANEOUS at 14:48

## 2018-12-03 NOTE — SOAPPROG
SOAP Progress Note


Assessment/Plan: 





Assessment/Plan: 48 woman w hx of T1cN0 ER+RI-HER2 neg breast cancer in 2012 s/

p L mastectomy and SLNB


This was followed by TC x 6 and she has been on tamoxifen since that time who 

presented w L hip pain discovered to have femoral neck fracture w diffuse 

osseous metastatic disease, presumably metastatic breast ca





1. L fem neck fracture - not amenable to surgery at this time given extensive 

lytic lesions


Pain controlled. Can ambulate w walker (non weight bearing) will need to see DR Hernandez as outpt


Was simulated and given one high fraction on Friday by Dr. Perry. 





2. Left rib pain-films reviewed with Dr. burgos. Has bone mets in lateral 8, 10

, 11 ribs. Suspect radiation would be of benefit. Discussed with Dr. Melendez, 

will get XRT as outpatient 





2. Diffuse metastatic dz - mets to lungs (lymphangitic spread?), liver (3 mets)

, skeleton and brain


soft tissue dx recommended for definitive path and molecular testing. Reviewed 

CT scans today with Dr. Burgos. Easily accessible lesion in left ileum with 

prominent soft tissue component. Preference is to obtain soft tissue sample (as 

opposed to bone) due to potential issues with false neg results on immunostains 

with the decalcification process. Will send for foundation testing, along with 

ER, RI and Her2. Received Zometa on 12/1. Treatment will likely be antiestrogen 

therapy with CDK inhibitor. Chemotherapy would be a consideration if lung 

disease proves to be lymphangitic and has progressive resp compromise. -Lupron 

tomorrow, PET as an outpatient. 





3. Low grade fever last night-unclear source.?Viral process vs tumor fever





4. ? Lymphangitic disease-CT/CXR's reviewed with Dr. Burgos, consideration for 

viral pneumonitis. Not hypoxemic but febrile. will monitor for now, no 

indication for frontline chemotherapy 














Subjective: 





Patient continues to have pain in her ribs - febrile 12/2 at 1600 to 38.3. No 

localizing symptoms. NPO for biopsy today.


Objective: 





 Vital Signs











Temp Pulse Resp BP Pulse Ox


 


 37.0 C   83   18   108/69   94 


 


 12/03/18 20:01  12/03/18 20:01  12/03/18 20:01  12/03/18 20:01  12/03/18 20:01








 Microbiology











 12/02/18 20:22 Respiratory Panel (PCR) - Final





 Nasal, Sinus - Swab    No Organism Detected By Pcr








 Laboratory Results





 12/03/18 04:42 





 











 12/02/18 12/03/18 12/04/18





 05:59 05:59 05:59


 


Intake Total 1434 417 7372


 


Output Total 1700 700 


 


Balance -150 -100 1330








 











PT  13.9 SEC (12.0-15.0)   11/28/18  21:30    


 


INR  1.05  (0.83-1.16)   11/28/18  21:30    














ICD10 Worksheet


Patient Problems: 


 Problems











Problem Status Onset


 


Fracture of femoral neck, left, closed Acute  


 


Mass of left lung Acute  


 


Mass of right lung Acute

## 2018-12-03 NOTE — PDRADPN
Radiology Procedure Note


Date of Procedure: 12/03/18


Radiologist: Dennis Pérez


Anesthesia: IV Sedation


Pre-op Diagnosis: breast cancer


Post-op Diagnosis: same


Procedure: CT-guided left ischial tuberosity lesio biopsy


Inf/Abcess present in the surg proc area at time of surgery?: No

## 2018-12-03 NOTE — HOSPPROG
Hospitalist Progress Note


Assessment/Plan: 


49 yo F w/ hx of breast CA presents with L hip pain and found to have 

widespread metastases and pathologic L hip fracture.





Plan: 


1. Breast CA, recurrent - Presented with various MSK complaints; imaging 

revealed widespread osseous metastases and likely lymphangitic spread in the 

lungs. She was initially diagnosed with DCIS in 2009 and treated with 

lumpectomy and radiation. She had Stage I disease recurrence in 2012 treated 

with mastectomy and chemotherapy. She follows with Dr. Iza Mays.


- Oncology consulted and following 


- Morphine PRN for pain control, Ativan PRN for anxiety


- Abd CT shows liver, lungs, thoracolumbar metastasis


- MRI Brain performed on 12/2 which showed 6 small intraparenchymal lesions 

concerning for brain metastasis 


- Bone biopsy of L ilium performed this morning 


- Lupron planned for today


- PET as an outpatient 





2. Pathologic L femoral neck fracture, acute - L hip pain was her presenting 

symptom. This will likely be a complex situation noting the extent of malignant 

involvement.


- Orthopedic surgery (Dr. Arechiga) consulted who recommends Orthopaedic Oncology 

consult, patient to f/u with Dr. Hernandez, Orthopaedic Oncologist at , with 

repeat imaging as an outpatient 


- Pain control as above 


- Was simulated and given one high fraction on Friday by Dr. Perry


- S/p Zometa on 12/per Onc





3. Fever 


- Febrile again overnight


- CXR performed on 12/1 which showed worsening pneumonitis, potentially viral


- Started on Ceftriaxone/Azithromycin by overnight MD, will continue for now





Diet - Regular


Code - Full


Ppx - SCDs pending surgical evaluation, high risk for VTE so would commence 

LMWH afterwards





Dispo - Pending clinical course, possible tomorrow 





Subjective: Patient upset this morning about NPO status, with dry mouth


Objective: 


 Vital Signs











Temp Pulse Resp BP Pulse Ox


 


 37.3 C   87   14   111/66   93 


 


 12/03/18 11:46  12/03/18 11:46  12/03/18 11:46  12/03/18 13:36  12/03/18 13:36








 Microbiology











 12/02/18 20:22 Respiratory Panel (PCR) - Final





 Nasal, Sinus - Swab    No Organism Detected By Pcr








 Laboratory Results





 12/03/18 04:42 





 











 12/02/18 12/03/18 12/04/18





 05:59 05:59 05:59


 


Intake Total 1550 600 30


 


Output Total 1700 700 


 


Balance -150 -100 30








 











PT  13.9 SEC (12.0-15.0)   11/28/18  21:30    


 


INR  1.05  (0.83-1.16)   11/28/18  21:30    














- Physical Exam


Constitutional: uncomfortable


Eyes: PERRL


Ears, Nose, Mouth, Throat: dry mucous membranes


Cardiovascular: regular rate and rhythym


Respiratory: no respiratory distress


Gastrointestinal: soft, non-tender abdomen


Skin: warm


Musculoskeletal: pain with ROM


Neurologic: AAOx3


Psychiatric: anxious





ICD10 Worksheet


Patient Problems: 


 Problems











Problem Status Onset


 


Fracture of femoral neck, left, closed Acute  


 


Mass of left lung Acute  


 


Mass of right lung Acute

## 2018-12-03 NOTE — PDPROPOC
Sedation Plan of Care


ASA Classification: ASA 3


Mallampati Score: Class 2


Mallampati Reference Image:

## 2018-12-04 PROCEDURE — 3E0T33Z INTRODUCTION OF ANTI-INFLAMMATORY INTO PERIPHERAL NERVES AND PLEXI, PERCUTANEOUS APPROACH: ICD-10-PCS | Performed by: RADIOLOGY

## 2018-12-04 PROCEDURE — 3E0T3BZ INTRODUCTION OF ANESTHETIC AGENT INTO PERIPHERAL NERVES AND PLEXI, PERCUTANEOUS APPROACH: ICD-10-PCS | Performed by: RADIOLOGY

## 2018-12-04 RX ADMIN — AZITHROMYCIN MONOHYDRATE SCH MLS: 500 INJECTION, POWDER, LYOPHILIZED, FOR SOLUTION INTRAVENOUS at 09:32

## 2018-12-04 RX ADMIN — MORPHINE SULFATE SCH MG: 15 TABLET, FILM COATED, EXTENDED RELEASE ORAL at 12:55

## 2018-12-04 RX ADMIN — MORPHINE SULFATE SCH MG: 15 TABLET, FILM COATED, EXTENDED RELEASE ORAL at 20:47

## 2018-12-04 RX ADMIN — ENOXAPARIN SODIUM SCH MG: 100 INJECTION SUBCUTANEOUS at 08:39

## 2018-12-04 NOTE — HOSPPROG
Hospitalist Progress Note


Assessment/Plan: 





Assessment/Plan: 


49 yo F w/ hx of breast CA presents with L hip pain and found to have 

widespread metastases and pathologic L hip fracture.





Plan: 


# Breast CA, recurrent -  Diagnosed with DCIS in 2009 and tx'd with lumpectomy 

and radiation.  Recurrence in 2012 treated with mastectomy and chemo. She 

follows with Dr. Iza Mays.  Recent imaging revealed widespread osseous 

metastases and likely lymphangitic spread in the lungs. Abd CT shows liver, 

lungs, thoracolumbar metastasis.  MRI Brain 12/2 showed 6 sm intraparenchymal 

lesions concerning for brain mets.  


- Oncology following 


- Start MS contin 15 mg bid plus Morphine IR for breakthrough pain


- s/p bone biopsy of left iliac lesion, path pending


- Lupron planned for today


- PET as an outpatient 





# Rib pain and pathologic L femoral neck fracture, acute - Orthopedic surgery (

Dr. Arechiga) consulted who recommends Orthopaedic Oncology consult, patient to f/

u with Dr. Hernandez, Orthopaedic Oncologist at , with repeat imaging as an 

outpatient 


- Pain control as above 


- Was simulated and given one high fraction on Friday by Dr. Perry


- S/p Zometa 


- IR to consult today





# Fever - unclear source, afebrile x48 hrs


- will complete short course of ceftriaxone, d/c azithromycin s/p 1.5 g





Diet - Regular


Code - Full


Ppx - SCDs for now, defer pharm with possible procedure planned





Dispo - inpatient for ongoing management acute cancer pain 





Subjective: Pt doing ok, pain a little better controlled.  Worried abt poor 

pain control at home.  No more fevers.  No cough, CP, SOB or urinary symptoms.


Objective: 


 Vital Signs











Temp Pulse Resp BP Pulse Ox


 


 36.3 C   71   16   102/65   95 


 


 12/04/18 09:07  12/04/18 09:07  12/04/18 09:07  12/04/18 09:07  12/04/18 09:07








 Microbiology











 12/02/18 20:22 Respiratory Panel (PCR) - Final





 Nasal, Sinus - Swab    No Organism Detected By Pcr








 Laboratory Results





 12/03/18 04:42 





 











 12/03/18 12/04/18 12/05/18





 05:59 05:59 05:59


 


Intake Total 600 1930 


 


Output Total 700  


 


Balance -100 1930 








 











PT  13.9 SEC (12.0-15.0)   11/28/18  21:30    


 


INR  1.05  (0.83-1.16)   11/28/18  21:30    














- Physical Exam


Constitutional: no apparent distress


Eyes: PERRL


Ears, Nose, Mouth, Throat: moist mucous membranes


Cardiovascular: regular rate and rhythym


Respiratory: no respiratory distress, clear to auscultation


Gastrointestinal: normoactive bowel sounds, soft, non-tender abdomen


Skin: warm


Musculoskeletal: full muscle strength


Neurologic: AAOx3


Psychiatric: interacting appropriately





ICD10 Worksheet


Patient Problems: 


 Problems











Problem Status Onset


 


Fracture of femoral neck, left, closed Acute  


 


Mass of left lung Acute  


 


Mass of right lung Acute

## 2018-12-04 NOTE — ASMTCMCOM
CM Note

 

CM Note                       

Notes:

Patient seen by Palliative Care Shadi Goins and CM to establish goals and provide information 

regarding palliative care. The patient, her mother and sister were all present. The patient is 

understandably emotional at times given the diagnosis of advanced metastatic cancer. She is open to 


having a palliative care provider. She shares with us she hopes to be able to return to work. Per 

therapies, she would benefit form C as well. Referral placed to [facility] for RN, PT and OT. Will send 


referral via [employer] to [facility]. BENI to follow.



Plan: Dc with [facility] and palliative care services when medically stable for discharge.

 

Date Signed:  12/04/2018 02:55 PM

Electronically Signed By:Linette Waller RN

## 2018-12-04 NOTE — SOAPPROG
SOAP Progress Note


Assessment/Plan: 





Assessment/Plan: 48 woman w hx of T1cN0 ER+ME-HER2 neg breast cancer in 2012 s/

p L mastectomy and SLNB


This was followed by TC x 6 and she has been on tamoxifen since that time who 

presented w L hip pain discovered to have femoral neck fracture w diffuse 

osseous metastatic disease, presumably metastatic breast ca





1. L fem neck fracture - not amenable to surgery at this time given extensive 

lytic lesions


Pain controlled. Can ambulate w walker (non weight bearing) will need to see DR Hernandez as outpt


Was simulated and given one high fraction on 11/30 by Dr. Perry. 





2. Left rib pain-films reviewed with Dr. burgos. Has bone mets in lateral 8, 10

, 11 ribs. Suspect radiation would be of benefit. Discussed with Dr. Melendez, 

will get XRT as outpatient 


-evaluate with IR for intercostal nerve block


-transition to oral pain medication 





3. Diffuse metastatic dz - mets to lungs (lymphangitic spread?), liver (3 mets)

, skeleton and brain


soft tissue dx recommended for definitive path and molecular testing. Reviewed 

CT scans today with Dr. Burgos. Easily accessible lesion in left ileum with 

prominent soft tissue component biopsied 12/3. Preference is to obtain soft 

tissue sample (as opposed to bone) due to potential issues with false neg 

results on immunostains with the decalcification process. Will send for 

foundation testing, along with ER, ME and Her2. Received Zometa on 12/1. 

Treatment will likely be antiestrogen therapy with CDK inhibitor. Chemotherapy 

would be a consideration if lung disease proves to be lymphangitic and has 

progressive resp compromise. 


-Lupron today, PET as an outpatient. 





4. Fevers


-unclear source. ?Viral process (CT lung findings, see below) vs tumor fever. 

No fever for 24 hours. No new symptoms. 





5. ? Lymphangitic disease-CT/CXR's reviewed with Dr. Burgos, consideration for 

viral pneumonitis. Not hypoxemic but febrile. no indication for frontline 

chemotherapy 














12/04/18 21:48





12/04/18 21:54





Subjective: 





Patient reports ongoing pain in the left rib cage, lower half. Some biopsy pain 

overnight but has resolved. Been getting IV morphine. NO new symptoms. No SOB 

or cough.


Objective: 





 Vital Signs











Temp Pulse Resp BP Pulse Ox


 


 36.8 C   74   16   109/77   95 


 


 12/04/18 20:07  12/04/18 20:07  12/04/18 20:07  12/04/18 20:07  12/04/18 20:07








 Laboratory Results





 12/03/18 04:42 





 











 12/03/18 12/04/18 12/05/18





 05:59 05:59 05:59


 


Intake Total 600 1930 500


 


Output Total 700  


 


Balance -100 1930 500








 











PT  13.9 SEC (12.0-15.0)   11/28/18  21:30    


 


INR  1.05  (0.83-1.16)   11/28/18  21:30    














General Appearance: alert, no distress


Respiratory: lungs clear to auscultation and percussion


Chest: discomfort left costal margin


Abdomen: non-tender, soft, no HSM


Lymphatic: no adenopathy


Ext: no LE edema


Neuro/Psych: alert and oriented 











ICD10 Worksheet


Patient Problems: 


 Problems











Problem Status Onset


 


Fracture of femoral neck, left, closed Acute  


 


Mass of left lung Acute  


 


Mass of right lung Acute

## 2018-12-05 VITALS — SYSTOLIC BLOOD PRESSURE: 112 MMHG | DIASTOLIC BLOOD PRESSURE: 72 MMHG

## 2018-12-05 RX ADMIN — ENOXAPARIN SODIUM SCH MG: 100 INJECTION SUBCUTANEOUS at 08:41

## 2018-12-05 RX ADMIN — MORPHINE SULFATE SCH MG: 15 TABLET, FILM COATED, EXTENDED RELEASE ORAL at 08:40

## 2018-12-05 RX ADMIN — IBUPROFEN PRN MG: 600 TABLET ORAL at 05:04

## 2018-12-05 NOTE — PDIAF
- Diagnosis


Diagnosis: metastatic cancer


Code Status: Full Code





- Medication Management


Discharge Medications: electronically signed and located in the Home Medication 

List.





- Orders


Services needed: Home Care, Registered Nurse, Physical Therapy


Home Care Face to Face: I certify that this patient was under my care and that 

I had the required face-to-face encounter meeting the encounter requirements on 

the discharge day.  My findings support the fact that the patient is homebound 

as defined in


Home Care Face to Face Continued: CMS Chapter 7 Medicare Benefits Manual 30.1.1

, The condition of the patient is such that there exists a normal inability to 

leave home and consequently, leaving home would require a considerable and 

taxing effort.


Diet Recommendation: no restrictions on diet


Additional Instructions: 


Followup with Orthopaedic Oncology: Dr. Hernandez at , office #[phone] 





Follow up with [facility] for Radiation therapy this coming Friday!





- Follow Up Care


Current Providers and Referrals: 


Iza Mays MD [Medical Doctor] - 


Perry Arechiga MD [Medical Doctor] - As per Instructions


Radha Love [Primary Care Provider] - As per Instructions

## 2018-12-05 NOTE — ASMTDCNOTE
Case Management Discharge

 

Discharge Order Complete?     Answers:  Yes                                   

Patient to Obtain             Answers:  via Family                            

Medications                                                                   

Transportation Arranged       Answers:  Family/Friends                        

Faxed Final Orders            Answers:  Yes                           Notes:  [facility] and [facility]

Discharge Comments            

Notes:

Medically cleared to dc to home with [facility] and Outpatient palliative care from [facility].

 

Date Signed:  12/05/2018 01:34 PM

Electronically Signed By:Linette Waller RN

## 2018-12-05 NOTE — GDS
DISCHARGE DIAGNOSES:  

1.  Metastatic breast cancer.

2.  Pathologic left femoral neck fracture.

3.  Cancer pain secondary to bone metastases.

4.  Fever without a source, resolved.



CONSULTANTS:  

1.  Dr. Agustina Espinosa, Oncology.

2.  Dr. Tom Fahrbach, Interventional Radiology.

3.  Dr. Perry Arechiga, Orthopedic Surgery.



PROCEDURES:  

1.  CT-guided bone biopsy, December 3, 2018, of a left distal tuberosity mass, with pathology reveali
ng metastatic carcinoma compatible with breast primary.

2.  Ultrasound-guided intercostal nerve block performed by Interventional Radiology with good respons
e.



HISTORY:  For details, please see history and physical dated November 29, 2018.  In brief, the bipin orourke is a 48-year-old female with a history of metastatic breast cancer, who presented to the emergency 
department with left hip pain and chest wall pain.  She was found to have a left femoral neck fractur
e and osseous metastases in her ribs, was admitted to the hospital for further management.



HOSPITAL COURSE:  Patient was admitted to the Cancer Care Unit.  Orthopedic Surgery consult was abilio pineda.  Based on the extensive nature of the fracture of her femoral head in the setting of extensive o
sseous metastatic disease, it was recommended that she be managed by orthopedic oncologist and was re
ferred to Dr. Hernandez at the [facility].  It is recommended she remain nonweightbearing to the left lowe
r extremity until followup with him.  Pain control was an issue during her hospitalization.  She was 
started on MS Contin with immediate release morphine for breakthrough pain.  This did improve her vera
n control.  In addition, Interventional Radiology consult was requested, and she underwent ultrasound
-guided intercostal nerve block, which provided her significant relief from her rib pain secondary to
 osseous metastases.  In addition, a bone biopsy of an ischial tuberosity mass was obtained, which wa
s consistent with primary breast metastatic lesion.  She was followed by Oncology during her hospital
ization.  She was given 1 dose of radiation on November 30 by Dr. Perry and will continue radiation 
therapy as an outpatient.  She was started on letrozole at the time of discharge, and also received a
 dose of Lupron on December 4.  She will need a PET scan as an outpatient, and this will be arranged 
by her primary oncologist.  In addition, she did present with a fever, possibly a tumor fever.  She h
ad no symptoms of pneumonia or urinary tract infection.  Her blood cultures were negative.  Respirato
ry viral panel PCR was also negative.  She received 5 days of ceftriaxone and 1.5 g of azithromycin f
or possible pneumonia, though her imaging is likely more consistent with pneumonitis or a viral proce
ss.  She remained afebrile for greater than 72 hours at the time of discharge.



DISPOSITION:  Patient is discharged home in stable condition, with plans for home health RN, PT.



FOLLOWUP:  

1.  Dr. Iza Mays, Oncology.

2.  Dr. Perry, Radiation Oncology.

3.  Dr. Hernandez at [facility] Orthopedic Oncology.

4.  Dr. Radha Love, primary care.  

5.  She was also given resources, including [facility] Palliative Care.





Job #:  [id number]

## 2018-12-05 NOTE — ASMTLACE
LACE

 

Length of stay for            Answers:  4-6 days                              

current admission                                                             

Acuity / Level of             Answers:  Yes                                   

Care: Did the patient                                                         

have an inpatient                                                             

admission?                                                                    

Comorbidities - select        Answers:  Any tumor (including                  

all that apply                          lymphoma or leukemia)                 

                                        Opioid dependence                     

                                        / Chronic pain                        

# of Emergency department     Answers:  1-2                                   

visits in the last 6                                                          

months                                                                        

Score: 14

 

Date Signed:  12/05/2018 01:24 PM

Electronically Signed By:Linette Waller RN

## 2018-12-05 NOTE — SOAPPROG
SOAP Progress Note


Assessment/Plan: 





Assessment/Plan: 48 woman w hx of T1cN0 ER+ND-HER2 neg breast cancer in 2012 s/

p L mastectomy and SLNB


This was followed by TC x 6 and she has been on tamoxifen since that time who 

presented w L hip pain discovered to have femoral neck fracture w diffuse 

osseous metastatic disease, metastatic breast ca





1. L fem neck fracture - not amenable to surgery at this time given extensive 

lytic lesions


Pain controlled. Can ambulate w walker (non weight bearing) will need to see 

Dr. Hernandez as outpt


Was simulated and given one high fraction on 11/30 by Dr. Perry.  Received 

Zometa on 12/1





2. Left rib pain-films reviewed with Dr. burgos. Has bone mets in lateral 8, 10

, 11 ribs. Discussed with Dr. Melendez, will get XRT as outpatient, today at 

3PM. Intercostal nerve block on 12/4 well tolerated 





3. Diffuse metastatic dz - mets to lungs (lymphangitic spread?), liver (3 mets)

, skeleton and brain


soft tissue dx recommended for definitive path and molecular testing. Reviewed 

CT scans today with Dr. Burgos. Easily accessible lesion in left ileum with 

prominent soft tissue component biopsied 12/3 with results compatible with 

breast cancer, ER positive. Will send for foundation testing. Treatment will be 

letrozole and CDK inhibitor. Lupron given 12/4. Chemotherapy would be a 

consideration if lung disease proves to be lymphangitic and has progressive 

resp compromise. 


-Letrozole today 2.5mg


-PET as an outpatient. 





4. Fevers


-unclear source. ?Viral process (CT lung findings, see below) vs tumor fever. 

No fever for 48 hours. No new symptoms. 





5. ? Lymphangitic disease-CT/CXR's reviewed with Dr. Burgos, consideration for 

viral pneumonitis. Not hypoxemic but febrile. no indication for frontline 

chemotherapy 














12/04/18 21:48





12/04/18 21:54





12/05/18 10:39





12/05/18 10:47





Subjective: 





patient reports pain improvement in left ribs with intercostal block yesterday. 

no fevers or chills. received lupron yesterday.  


Objective: 





 Vital Signs











Temp Pulse Resp BP Pulse Ox


 


 36.7 C   70   14   97/60 L  94 


 


 12/05/18 07:32  12/05/18 07:32  12/05/18 07:32  12/05/18 07:32  12/05/18 07:32








 Laboratory Results





 12/03/18 04:42 





 











 12/04/18 12/05/18 12/06/18





 05:59 05:59 05:59


 


Intake Total 1930 675 


 


Balance 1930 675 








 











PT  13.9 SEC (12.0-15.0)   11/28/18  21:30    


 


INR  1.05  (0.83-1.16)   11/28/18  21:30    














General Appearance: alert, no distress


Respiratory: lungs clear to auscultation and percussion


Chest: discomfort left costal margin


Abdomen: non-tender, soft, no HSM


Lymphatic: no adenopathy


Ext: no LE edema


Neuro/Psych: alert and oriented 

















ICD10 Worksheet


Patient Problems: 


 Problems











Problem Status Onset


 


Fracture of femoral neck, left, closed Acute  


 


Mass of left lung Acute  


 


Mass of right lung Acute

## 2018-12-06 NOTE — ASDISCHSUM
----------------------------------------------

Discharge Information

----------------------------------------------

Plan Status:Home with Home Health                    Medically Cleared to Leave:12/04/2018

Discharge Date:12/05/2018 01:26 PM                    D/C Disposition:Home Health Service

Critical access hospital D/C Disposition:Home, Routine, Self-Care         Projected Discharge Date:12/05/2018 11:00 AM

Transportation at D/C:                               Discharge Delay Reason:

Follow-Up Date:12/05/2018 11:00 AM                   Discharge Slot:

Final Diagnosis:

----------------------------------------------

Placement Information

----------------------------------------------

Referral Type:Palliative Care                        Referral ID:[id number]

Provider Name:[facility]

Address 1:[address]                            Phone Number:

Address 2:                                           Fax Number:

[facility]:[place]                                            Selection Factors:

State:[place]

 

Referral Type:*Home Health Care Services             Referral ID:[id number]

Provider Name:[facility]

Address 1:[address] [place] [address]                  Phone [phone]

Address 2:                                           Fax [phone]

[facility]:[place]                                         Selection Factors:

State:[place]

 

----------------------------------------------

Patient Contact Information

----------------------------------------------

Contact Name:SHARONMCKAY                             Relationship:Sister

Address:                                             Home [phone]

                                                     Work Phone:

City:[place]                                          Alternate Phone:

[facility]/Zip Code:[place]                                    Email:

----------------------------------------------

Financial Information

----------------------------------------------

Financial Class:[employer]

Primary Plan Desc:[employer] [facility]       Primary Plan Number:[id number]

Secondary Plan Desc:                                 Secondary Plan Number:

 

 

----------------------------------------------

Assessment Information

----------------------------------------------

----------------------------------------------

LACE

----------------------------------------------

LACE

 

Length of stay for            Answers:  4-6 days                              

current admission                                                             

Acuity / Level of             Answers:  Yes                                   

Care: Did the patient                                                         

have an inpatient                                                             

admission?                                                                    

Comorbidities - select        Answers:  Any tumor (including                  

all that apply                          lymphoma or leukemia)                 

                                        Opioid dependence                     

                                        / Chronic pain                        

# of Emergency department     Answers:  1-2                                   

visits in the last 6                                                          

months                                                                        

Score: 14

 

Date Signed:  12/05/2018 01:24 PM

Electronically Signed By:Linette Waller RN

 

 

----------------------------------------------

[facility] CM Progress Note

----------------------------------------------

CM Note

 

CM Note                       

Notes:

Pt is single and has boyfriend Jeevan and sister Erika who are supportive. Chart reviewed in 

rounds.  Pt admitted for femoral fracture due to extensive metastatic Breast CA after several years 


of remission and double mastectomy in 2012. Oncologist is recommending pt to orthopedic oncologist 

at [facility], however extent of disease would make recovery from surgery grim. 



 Plan is now to manage pain and mobilize pt but if this is not possible, transfer her to [facility] for 

further evaluation. Camille Bill, navigator called to consult with pt. Therapies have yet to 

evaluate. Discharge plan TBD. 

 

Date Signed:  11/29/2018 05:05 PM

Electronically Signed By:Oksana Olivera

 

 

----------------------------------------------

[facility] Progress Note

----------------------------------------------

CM Note

 

CM Note                       

Notes:

Pt admitted for metastatic cancer to bone with femur fracture. PT and OT recommending home 

care. Palliative eval orders placed today at end of day. Pt agreeable to home therapy through [facility] 

and they are able to accept. 



BENI also mentioned that palliative orders had been placed and pt became tearful. BENI did some 

appropriate education on the function of palliative care and told pt that  would be 

available to chat with her over the weekend about what palliative care is and how it may be helpful 


and what agencies are available. 



Family and friends were in the room and expressed overwhelm at number of decisions to be made. BENI 

explained that decisions regarding support were only needed in the timing that was comfortable for 

the pt, and that the RN Navigator was a great resource when they were ready. It may be appropriate 

for  to touch base with pt over the weekend still for support and listening. 



D/C Plan: Home with [facility] PT/OT

 

Date Signed:  11/30/2018 04:27 PM

Electronically Signed By:Oksana Olivera

 

 

----------------------------------------------

[facility] Progress Note

----------------------------------------------

CM Note

 

CM Note                       

Notes:

Patient seen by Palliative Care Shadi Goins and CM to establish goals and provide information 

regarding palliative care. The patient, her mother and sister were all present. The patient is 

understandably emotional at times given the diagnosis of advanced metastatic cancer. She is open to 


having a palliative care provider. She shares with us she hopes to be able to return to work. Per 

therapies, she would benefit form [facility] as well. Referral placed to [facility] for RN, PT and OT. Will send 


referral via [employer] to [facility].  to follow.



Plan: Dc with [facility] and palliative care services when medically stable for discharge.

 

Date Signed:  12/04/2018 02:55 PM

Electronically Signed By:Linette Waller RN

 

 

----------------------------------------------

Case Management Discharge Plan Note

----------------------------------------------

Case Management Discharge

 

Discharge Order Complete?     Answers:  Yes                                   

Patient to Obtain             Answers:  via Family                            

Medications                                                                   

Transportation Arranged       Answers:  Family/Friends                        

Faxed Final Orders            Answers:  Yes                           Notes:  [facility] and [facility]

Discharge Comments            

Notes:

Medically cleared to dc to home with [facility] and Outpatient palliative care from [facility].

 

Date Signed:  12/05/2018 01:34 PM

Electronically Signed By:Linette Waller RN

 

 

----------------------------------------------

Intervention Information

----------------------------------------------

## 2018-12-15 ENCOUNTER — HOSPITAL ENCOUNTER (EMERGENCY)
Dept: HOSPITAL 80 - FED | Age: 48
End: 2018-12-15
Payer: COMMERCIAL

## 2018-12-15 VITALS — SYSTOLIC BLOOD PRESSURE: 73 MMHG | DIASTOLIC BLOOD PRESSURE: 45 MMHG

## 2018-12-15 DIAGNOSIS — Z85.3: ICD-10-CM

## 2018-12-15 DIAGNOSIS — I46.9: Primary | ICD-10-CM

## 2018-12-15 DIAGNOSIS — C79.51: ICD-10-CM

## 2018-12-15 DIAGNOSIS — E86.9: ICD-10-CM

## 2018-12-15 LAB
INR PPP: 1.51 (ref 0.83–1.16)
PLATELET # BLD: 197 10^3/UL (ref 150–400)
PROTHROMBIN TIME: 18.4 SEC (ref 12–15)

## 2018-12-15 PROCEDURE — 0BH18EZ INSERTION OF ENDOTRACHEAL AIRWAY INTO TRACHEA, VIA NATURAL OR ARTIFICIAL OPENING ENDOSCOPIC: ICD-10-PCS | Performed by: EMERGENCY MEDICINE

## 2018-12-15 NOTE — EDPHY
HPI/HX/ROS/PE/MDM


Narrative: 





CHIEF COMPLAINT: Altered mental status 





HPI: 





This patient is a 48 year old female with history of metastatic breast cancer. 

She was recently released from the hospital approximately 1-2 weeks ago, which 

is when she was diagnosed with widely metastatic disease, including mets to 

brain, spine and a pathologic left hip fracture.  Family states that patient 

has been experiencing problems with constipation thought secondary to morphine 

use.  They have been treating with several laxatives without relief.  This 

morning, family noticed patient was quite agitated and lethargic, which they 

assumed might be because of the laxatives.   She had one episode of non-bilious 

emesis around 4am.  She was given Ativan at 11am.  After failure to improve, 

family consulted with oncologist over the phone who recommended going to the 

ER.  EMS noted  . No IV access was available so a right humeral IO was 

started prior to arrival.








REVIEW OF SYSTEMS:


A comprehensive 10 system review of systems is otherwise negative aside from 

elements mentioned in the history of present illness and medical decision 

making.  This was obtained from family.





PMH:


Widely-metastatic breast cancer.  Treatment with radiation, Zometa, Lupron.  L 

hip fracture secondary secondary to bone metastasis. 





SOCIAL HISTORY:  Here with sister and partner.  





PHYSICAL EXAM:


General:Patient is tachypneic, agitated and appears to be severely ill. 


Head: Atraumatic. 


ENT:No jaundice.  Mucous membranes dry. 


Neck: Normal inspection.  Full range of motion.


Respiratory: Tachypneic. Left mastectomy scar noted.


Cardiovascular: Tachycardic, weak carotid pulse.


Abdomen:The abdomen is nontender to palpation.


Skin: Skin appears diffusely mottled, cool.  


Extremities: No signs of trauma. 


Neuro: Patient denies pain, no focal deficits noted. 





ED Course: 





13:22 Met EMS on arrival 





48 year old female with metastatic breast cancer presents with altered mental 

status, tachypnea onset this morning. She is hypotensive and tachypneic here in 

the ED. Plan for EKG, labs including CBC, chemistries, POC troponin, lactic acid

, UA. 





WBC elevated at 22,000. Hypotensive at 71/49. POC lactic acid 9.7. AMS. [Septic 

shock.] Afebrile. 





13:50 Plan for central line. Patient is comfortable with this procedure. She 

declines intubation should that become necessary. 





Tachypneic RR 46. Snoring. Mottling of skin. 





13:59 Troponin 3.16 





14:00 SpO2 in 70s and patient remains tachypneic, pale, skin mottled. Patient 

and family agree to intubation. 





14:02 EKG was ordered and interpreted by myself.  Please see [employer] system 

for official reading.





14:05 RT at bedside 





14:07 Plan for intubation. Patient preoxygenated at 100%





14:10 Confirmed with patient that she agrees to intubation at this time. 





14:13 Procedure: Rapid sequence intubation.


Indication for the procedure was airway protection, respiratory failure. The 

patient was preoxygenated with 100% oxygen by face mask and nasal cannula. The 

patient was given the following IV medications: 20mg Etomidate, 100mg 

succinylcholine. The patient was orally endotracheally intubated under direct 

visualization with a 7.5 ETT.  Tracheal intubation was confirmed with misting 

on the tube; breath sounds were auscultated equally bilaterally; appropriate 

color change with [employer] End Tidal CO2 detector. Chest X-ray shows ETT in good 

position. The procedure was performed by myself, Dr. Whyte.





14:30 Patient bradycardic around 35 bpm. 





14:33 Administered epinephrine per protocol.  





14:33 Pulses not found. CPR initiated. 





14:34 Shock administered. 





14:36 V fib on monitor.  Second shock administered. 





14:37 Intubation check





14:38 IO placed R tibia.





14:38 ROSC. Strong carotid and femoral pulse. 





14:39 No pulse found. Resumed CPR. Administered epi.  





14:41 Pulse check. Heart motion noted on US. No pulses found. CPR continued. 





14:43 Pulse check. CPR continued. 





14:45 Pulse check. CPR continued. Plan to administer third dose epi. 

Administered 14:46. 





14:47 Pulse check. CPR continued. 





No pericardial effusion. Normal chest x-ray. SpO2 100%. 





14:48 Family at bedside. 





14:53 Femoral US indicating weak pulse. Plan for repeat EKG. 





14:56 Repeat EKG was ordered and interpreted by myself.  Please see [email or web address] 

system for official reading.





14:56 Spoke with Dr. Reaves, hospitalist. He is at bedside. 





15:00 Patient pulseless. Resume CPR. 





Reviewed recent brain MRI results. Metastatic lesions to brain present. 





15:06 Procedure: Rapid sequence intubation.


Indication for the procedure was airway protection, respiratory failure. The 

patient was preoxygenated with 100% oxygen by face mask and nasal cannula. The 

patient was given the following IV medications: 20mg Etomidate, 100mg 

succinylcholine. The patient was orally endotracheally intubated under direct 

visualization with a 7.5 ETT.  Tracheal intubation was confirmed with misting 

on the tube; breath sounds were auscultated equally bilaterally; appropriate 

color change with [employer] End Tidal CO2 detector. Chest X-ray shows ETT in good 

position. The procedure was performed by myself, Dr. Whyte.





15:07 Patient is re-intubated. 





15:11 Pulse check. Disorganized cardiac movement on US. Continued CPR. 





15:11 Reviewed I-stat. POC Lactic acid 12.59. 





15:15 Pulse check. Resumed CPR. 





15:20 Discussed cessation of CPR with patient's family at bedside. Suspect PE 

or other clotting process. Given patient's severe metastases to her brain, she 

is not a candidate for anti-clot medications. Dr. Reaves, at bedside, concurs. 

We have explained the situation to the family.  





15:22 Pulse check. US unchanged. Dr. Reaves recommends cessation of CPR to the 

family at this point given the interventions performed. The patient's family at 

bedside accept this. CPR stopped at this time. 





Time of death 15:23.





I spent a total of 120 minutes of critical care time in obtaining history, 

performing a physical exam, bedside monitoring of interventions, collecting and 

interpreting tests and discussion with consultants but not including time spent 

performing procedures.








MDM: 





This patient with history of widely metastatic breast CA and recent discharge 

from the hospital presented with overall weakness and discomfort, thought to be 

related to laxative use/constipation, but arrived to ED in extremis.  Her SBP 

was extremely low on arrival, and she was tachypneic with diffuse skin 

mottling.  After initial IV access, she was moved to the resuscitation room for 

central line placement and intensive care.  During prep for central line 

placement, the patient was noted to by hypoxic to the 70s with worsening 

respiratory function.  Despite earlier stated desire to not be intubated, I 

discussed this option with family in the family room who felt comfortable with 

intubation, and with the patient herself.  Intubation was uncomplicated and 

patient seemed to tolerate procedure well.  ETT position was confirmed via 

direct visualization as well as color change and CXR.  Approximately 5-7 

minutes after intubation, patient was noted to liang down to the 30s with no 

palpable pulse. Ventilator was disconnected and patient bagged.  I reconfirmed 

tube placement and began ACLS.  The patient had one episode of brief ROSC, but 

steadily declined with PEA noted.  No pericardial effusion was seen on US.  

Family was invited into resuscitation room.  At their urging we continued CPR 

for many more rounds with only steady deterioration and lack of response.  In 

consultation with family, we all agreed that further resuscitation was futile.  

Cause of death is unknown.  Patient is certainly at risk for massive PE, but 

not a good candidate for tPA given recently diagnosed brain metastases and 

overall poor chance of survival. 





- Data Points


Imaging Results: 


 Imaging Impressions





Chest X-Ray  12/15/18 14:16


Impression: Good ET tube position.


 











Imaging: I viewed and interpreted images myself


Laboratory Results: 


 Laboratory Results





 12/15/18 13:48 





 12/15/18 13:48 





 











  12/15/18 12/15/18 12/15/18





  15:11 15:05 13:57


 


WBC      





    


 


RBC      





    


 


Hgb      





    


 


POC Hgb    8.8 gm/dL L gm/dL  





    (12.6-16.3)  


 


Hct      





    


 


POC Hct    26 % L %  





    (38-47)  


 


MCV      





    


 


MCH      





    


 


MCHC      





    


 


RDW      





    


 


Plt Count      





    


 


MPV      





    


 


Neut % (Auto)      





    


 


Lymph % (Auto)      





    


 


Mono % (Auto)      





    


 


Eos % (Auto)      





    


 


Baso % (Auto)      





    


 


Nucleat RBC Rel Count      





    


 


Absolute Neuts (auto)      





    


 


Absolute Lymphs (auto)      





    


 


Absolute Monos (auto)      





    


 


Absolute Eos (auto)      





    


 


Absolute Basos (auto)      





    


 


Absolute Nucleated RBC      





    


 


Immature Gran %      





    


 


Seg Neutrophils %      





    


 


Band Neutrophils %      





    


 


Lymphocytes %      





    


 


Monocytes %      





    


 


Eosinophils %      





    


 


Basophils %      





    


 


Metamyelocytes %      





    


 


Myelocytes %      





    


 


Promyelocytes %      





    


 


Blast Cells %      





    


 


Immature Gran #      





    


 


Absolute Seg Neuts      





    


 


Absolute Band Neuts      





    


 


Absolute Lymphocytes      





    


 


Absolute Monocytes      





    


 


Absolute Eosinophils      





    


 


Absolute Basophils      





    


 


Absolute Metamyelocyte      





    


 


Absolute Myelocytes      





    


 


Absolute Promyelocytes      





    


 


Absolute Plasma Cells      





    


 


Nucleated RBCs      





    


 


Absolute Blast Cells      





    


 


Plasma Cells %      





    


 


Platelet Estimate      





    


 


Polychromasia      





    


 


Echinocytes      





    


 


PT      





    


 


INR      





    


 


APTT      





    


 


POC Blood Source  VENOUS     VENOUS 





    


 


Patient Temperature  36.4 DEGREES DEGREES    36.4 DEGREES DEGREES





    


 


POC VBG pH  7.04  L     7.15  L 





   (7.31-7.42)    (7.31-7.42) 


 


POC VBG pCO2  48 mmHg H mmHg    37 mmHg L mmHg





   (40-44)    (40-44) 


 


POC VBG pO2  35 mmHg mmHg    23 mmHg L mmHg





   (35-40)    (35-40) 


 


POC VBG HCO3  13 mEq/L L mEq/L    13 mEq/L L mEq/L





   (22-26)    (22-26) 


 


POC VBG Total CO2  15 mEq/L L mEq/L    14 mEq/L L mEq/L





   (21-27)    (21-27) 


 


POC VBG Base Excess  -18.0 mEq/L L mEq/L    -16.0 mEq/L L mEq/L





   (-2.5-2.5)    (-2.5-2.5) 


 


VBG Lactic Acid      





    


 


POC Mix VBG O2 Sat  46 % L %    28 % L %





   (65-75)    (65-75) 


 


POC FiO2      20.0000 % %





     (0-100) 


 


POC Sodium    147 mEq/L H mEq/L  





    (135-145)  


 


Sodium      





    


 


POC Potassium    3.8 mEq/L mEq/L  





    (3.3-5.0)  


 


Potassium      





    


 


POC Chloride    110 mEq/L mEq/L  





    ()  


 


Chloride      





    


 


Carbon Dioxide      





    


 


Anion Gap      





    


 


POC BUN    23 mg/dL mg/dL  





    (7-23)  


 


BUN      





    


 


Creatinine      





    


 


POC Creatinine    1.5 mg/dL H mg/dL  





    (0.6-1.0)  


 


Estimated GFR      





    


 


Glucose      





    


 


POC Glucose    246 mg/dL H mg/dL  





    ()  


 


POC Lactic Acid French  12.6 mmol/L H mmol/L    9.3 mmol/L H mmol/L





   (0.7-2.1)    (0.7-2.1) 


 


Calcium      





    


 


Total Bilirubin      





    


 


Conjugated Bilirubin      





    


 


Unconjugated Bilirubin      





    


 


AST      





    


 


ALT      





    


 


Alkaline Phosphatase      





    


 


POC Troponin I      





    


 


Total Protein      





    


 


Albumin      





    


 


Lipase      





    














  12/15/18 12/15/18 12/15/18





  13:50 13:49 13:48


 


WBC      





    


 


RBC      





    


 


Hgb      





    


 


POC Hgb  15.3 gm/dL gm/dL    





   (12.6-16.3)   


 


Hct      





    


 


POC Hct  45 % %    





   (38-47)   


 


MCV      





    


 


MCH      





    


 


MCHC      





    


 


RDW      





    


 


Plt Count      





    


 


MPV      





    


 


Neut % (Auto)      





    


 


Lymph % (Auto)      





    


 


Mono % (Auto)      





    


 


Eos % (Auto)      





    


 


Baso % (Auto)      





    


 


Nucleat RBC Rel Count      





    


 


Absolute Neuts (auto)      





    


 


Absolute Lymphs (auto)      





    


 


Absolute Monos (auto)      





    


 


Absolute Eos (auto)      





    


 


Absolute Basos (auto)      





    


 


Absolute Nucleated RBC      





    


 


Immature Gran %      





    


 


Seg Neutrophils %      





    


 


Band Neutrophils %      





    


 


Lymphocytes %      





    


 


Monocytes %      





    


 


Eosinophils %      





    


 


Basophils %      





    


 


Metamyelocytes %      





    


 


Myelocytes %      





    


 


Promyelocytes %      





    


 


Blast Cells %      





    


 


Immature Gran #      





    


 


Absolute Seg Neuts      





    


 


Absolute Band Neuts      





    


 


Absolute Lymphocytes      





    


 


Absolute Monocytes      





    


 


Absolute Eosinophils      





    


 


Absolute Basophils      





    


 


Absolute Metamyelocyte      





    


 


Absolute Myelocytes      





    


 


Absolute Promyelocytes      





    


 


Absolute Plasma Cells      





    


 


Nucleated RBCs      





    


 


Absolute Blast Cells      





    


 


Plasma Cells %      





    


 


Platelet Estimate      





    


 


Polychromasia      





    


 


Echinocytes      





    


 


PT      





    


 


INR      





    


 


APTT      





    


 


POC Blood Source      





    


 


Patient Temperature      





    


 


POC VBG pH      





    


 


POC VBG pCO2      





    


 


POC VBG pO2      





    


 


POC VBG HCO3      





    


 


POC VBG Total CO2      





    


 


POC VBG Base Excess      





    


 


VBG Lactic Acid      





    


 


POC Mix VBG O2 Sat      





    


 


POC FiO2      





    


 


POC Sodium  143 mEq/L mEq/L    





   (135-145)   


 


Sodium      REJ 





    


 


POC Potassium  4.3 mEq/L mEq/L    





   (3.3-5.0)   


 


Potassium      REJ 





    


 


POC Chloride  107 mEq/L mEq/L    





   ()   


 


Chloride      REJ 





    


 


Carbon Dioxide      REJ 





    


 


Anion Gap      REJ 





    


 


POC BUN  19 mg/dL mg/dL    





   (7-23)   


 


BUN      REJ 





    


 


Creatinine      REJ 





    


 


POC Creatinine  1.2 mg/dL H mg/dL    





   (0.6-1.0)   


 


Estimated GFR      REJ 





    


 


Glucose      REJ 





    


 


POC Glucose  290 mg/dL H mg/dL    





   ()   


 


POC Lactic Acid French      





    


 


Calcium      REJ 





    


 


Total Bilirubin      REJ 





    


 


Conjugated Bilirubin      REJ 





    


 


Unconjugated Bilirubin      REJ 





    


 


AST      REJ 





    


 


ALT      REJ 





    


 


Alkaline Phosphatase      REJ 





    


 


POC Troponin I    3.16 ng/mL H ng/mL  





    (0.00-0.08)  


 


Total Protein      REJ 





    


 


Albumin      REJ 





    


 


Lipase      REJ 





    














  12/15/18 12/15/18 12/15/18





  13:48 13:48 13:45


 


WBC    22.66 10^3/uL H 10^3/uL  





    (3.80-9.50)  


 


RBC    4.47 10^6/uL 10^6/uL  





    (4.18-5.33)  


 


Hgb    13.9 g/dL g/dL  





    (12.6-16.3)  


 


POC Hgb      





    


 


Hct    43.3 % %  





    (38.0-47.0)  


 


POC Hct      





    


 


MCV    96.9 fL fL  





    (81.5-99.8)  


 


MCH    31.1 pg pg  





    (27.9-34.1)  


 


MCHC    32.1 g/dL L g/dL  





    (32.4-36.7)  


 


RDW    12.3 % %  





    (11.5-15.2)  


 


Plt Count    197 10^3/uL 10^3/uL  





    (150-400)  


 


MPV    9.3 fL fL  





    (8.7-11.7)  


 


Neut % (Auto)    Not Reported   





    


 


Lymph % (Auto)    Not Reported   





    


 


Mono % (Auto)    Not Reported   





    


 


Eos % (Auto)    Not Reported   





    


 


Baso % (Auto)    Not Reported   





    


 


Nucleat RBC Rel Count    Not Reported   





    


 


Absolute Neuts (auto)    Not Reported   





    


 


Absolute Lymphs (auto)    Not Reported   





    


 


Absolute Monos (auto)    Not Reported   





    


 


Absolute Eos (auto)    Not Reported   





    


 


Absolute Basos (auto)    Not Reported   





    


 


Absolute Nucleated RBC    Not Reported   





    


 


Immature Gran %    Not Reported   





    


 


Seg Neutrophils %    90.0 % %  





    


 


Band Neutrophils %    2.0 % %  





    


 


Lymphocytes %    4.0 % %  





    


 


Monocytes %    4.0 % %  





    


 


Eosinophils %    0.0 % %  





    


 


Basophils %    0.0 % %  





    


 


Metamyelocytes %    0.0 % %  





    


 


Myelocytes %    0.0 % %  





    


 


Promyelocytes %    0.0 % %  





    


 


Blast Cells %    0.0 % %  





    


 


Immature Gran #    Not Reported   





    


 


Absolute Seg Neuts    20.39 10^3/uL H 10^3/uL  





    (1.70-6.50)  


 


Absolute Band Neuts    0.45 10^3/uL 10^3/uL  





    (0.00-0.70)  


 


Absolute Lymphocytes    0.91 10^3/uL L 10^3/uL  





    (1.00-3.00)  


 


Absolute Monocytes    0.91 10^3/uL H 10^3/uL  





    (0.30-0.80)  


 


Absolute Eosinophils    0.00 10^3/uL L 10^3/uL  





    (0.03-0.40)  


 


Absolute Basophils    0.00 10^3/uL L 10^3/uL  





    (0.02-0.10)  


 


Absolute Metamyelocyte    0.00 10^3/mL 10^3/mL  





    (0.00-0.00)  


 


Absolute Myelocytes    0.00 10^3/mL 10^3/mL  





    (0.00-0.00)  


 


Absolute Promyelocytes    0.00 10^3/uL 10^3/uL  





    (0.00-0.00)  


 


Absolute Plasma Cells    0.00 10^3/uL 10^3/uL  





    (0.00-0.00)  


 


Nucleated RBCs    0 /100 WBC /100 WBC  





    (0-0)  


 


Absolute Blast Cells    0.00 10^3/uL 10^3/uL  





    (0.00-0.00)  


 


Plasma Cells %    0.0 % %  





    


 


Platelet Estimate    ADEQUATE   





    (ADEQ)  


 


Polychromasia    1+  H   





    


 


Echinocytes    1+  H   





    


 


PT  18.4 SEC H SEC    





   (12.0-15.0)   


 


INR  1.51  H     





   (0.83-1.16)   


 


APTT  29.9 SEC SEC    





   (23.0-38.0)   


 


POC Blood Source      





    


 


Patient Temperature      





    


 


POC VBG pH      





    


 


POC VBG pCO2      





    


 


POC VBG pO2      





    


 


POC VBG HCO3      





    


 


POC VBG Total CO2      





    


 


POC VBG Base Excess      





    


 


VBG Lactic Acid      9.7 mmol/L H mmol/L





     (0.7-2.1) 


 


POC Mix VBG O2 Sat      





    


 


POC FiO2      





    


 


POC Sodium      





    


 


Sodium      





    


 


POC Potassium      





    


 


Potassium      





    


 


POC Chloride      





    


 


Chloride      





    


 


Carbon Dioxide      





    


 


Anion Gap      





    


 


POC BUN      





    


 


BUN      





    


 


Creatinine      





    


 


POC Creatinine      





    


 


Estimated GFR      





    


 


Glucose      





    


 


POC Glucose      





    


 


POC Lactic Acid French      





    


 


Calcium      





    


 


Total Bilirubin      





    


 


Conjugated Bilirubin      





    


 


Unconjugated Bilirubin      





    


 


AST      





    


 


ALT      





    


 


Alkaline Phosphatase      





    


 


POC Troponin I      





    


 


Total Protein      





    


 


Albumin      





    


 


Lipase      





    











Medications Given: 


 








Discontinued Medications





Amiodarone HCl (Amiodarone Hcl)  300 mg IV EDNOW ONE


   Stop: 12/15/18 14:39


   Last Admin: 12/15/18 14:38 Dose:  300 mg


Epinephrine HCl (Epinephrine)  1 mg IVP EDNOW ONE


   Stop: 12/15/18 14:36


   Last Admin: 12/15/18 14:35 Dose:  1 mg


Epinephrine HCl (Epinephrine)  1 mg IVP EDNOW ONE


   Stop: 12/15/18 14:40


   Last Admin: 12/15/18 14:49 Dose:  1 mg


Epinephrine HCl (Epinephrine)  1 mg IVP EDNOW ONE


   Stop: 12/15/18 14:47


   Last Admin: 12/15/18 14:46 Dose:  1 mg


Epinephrine HCl (Epinephrine)  1 mg IVP EDNOW ONE


   Stop: 12/15/18 14:50


   Last Admin: 12/15/18 14:49 Dose:  1 mg


Epinephrine HCl (Epinephrine)  1 mg IVP EDNOW ONE


   Stop: 12/15/18 14:54


   Last Admin: 12/15/18 14:53 Dose:  1 mg


Epinephrine HCl (Epinephrine)  1 mg IVP EDNOW ONE


   Stop: 12/15/18 15:01


   Last Admin: 12/15/18 15:00 Dose:  1 mg


Epinephrine HCl (Epinephrine)  1 mg IVP EDNOW ONE


   Stop: 12/15/18 15:11


   Last Admin: 12/15/18 15:10 Dose:  1 mg


Etomidate (Etomidate)  20 mg IVP EDNOW ONE


   Stop: 12/15/18 14:06


   Last Admin: 12/15/18 14:08 Dose:  20 mg


Sodium Chloride (Ns)  1,000 mls @ 0 mls/hr IV EDNOW ONE; Wide Open


   PRN Reason: Protocol


   Stop: 12/15/18 13:45


   Last Admin: 12/15/18 13:46 Dose:  1,000 mls


Sodium Chloride (Ns)  1,000 mls @ 0 mls/hr IV EDNOW ONE; Wide Open


   PRN Reason: Protocol


   Stop: 12/15/18 13:45


   Last Admin: 12/15/18 13:49 Dose:  1,000 mls


Norepinephrine 4 mg/ Sodium (Chloride)  504 mls @ 0 mls/hr IV CONT MAXINE; Per 

Protocol


   PRN Reason: Protocol


   Stop: 19 14:29


   Last Admin: 12/15/18 14:27 Dose:  504 mls


Dopamine HCl/Dextrose (Dopamine 1600 Mcg/Ml (Premix))  250 mls @ 0 mls/hr IV 

EDNOW ONE; Per Protocol


   PRN Reason: Protocol


   Stop: 12/15/18 14:24


   Last Admin: 12/15/18 14:55 Dose:  250 mls


Epinephrine HCl 1 mg/ Sodium (Chloride)  251 mls @ 0 mls/hr IV EDNOW ONE; 

Titrate


   PRN Reason: Protocol


   Stop: 12/15/18 15:00


   Last Admin: 12/15/18 15:48 Dose:  Not Given


Sodium Bicarbonate (Sodium Bicarbonate)  50 meq IVP EDNOW ONE


   Stop: 12/15/18 14:44


   Last Admin: 12/15/18 14:43 Dose:  50 meq


Sodium Bicarbonate (Sodium Bicarbonate)  50 meq IVP EDNOW ONE


   Stop: 12/15/18 14:44


   Last Admin: 12/15/18 14:48 Dose:  50 meq


Succinylcholine Chloride (Quelicin)  100 mg IVP EDNOW ONE


   Stop: 12/15/18 14:06


   Last Admin: 12/15/18 14:08 Dose:  100 mg





Point of Care Test Results: 


 Chemistry











  12/15/18 12/15/18 12/15/18





  15:05 13:50 13:49


 


POC Sodium  147 mEq/L H mEq/L  143 mEq/L mEq/L  





   (135-145)   (135-145)  


 


POC Potassium  3.8 mEq/L mEq/L  4.3 mEq/L mEq/L  





   (3.3-5.0)   (3.3-5.0)  


 


POC Chloride  110 mEq/L mEq/L  107 mEq/L mEq/L  





   ()   ()  


 


POC BUN  23 mg/dL mg/dL  19 mg/dL mg/dL  





   (7-23)   (7-23)  


 


POC Creatinine  1.5 mg/dL H mg/dL  1.2 mg/dL H mg/dL  





   (0.6-1.0)   (0.6-1.0)  


 


POC Glucose  246 mg/dL H mg/dL  290 mg/dL H mg/dL  





   ()   ()  


 


POC Troponin I      3.16 ng/mL H ng/mL





     (0.00-0.08) 








 Blood Gas/Lactic Acid-Arterial











  12/15/18 12/15/18





  13:57 15:11


 


POC Blood Source  VENOUS  VENOUS


 


POC FiO2  20.0000 








 Blood Gas/Lactic Acid-Venous











  12/15/18 12/15/18





  15:11 13:57


 


POC VBG pH  7.04  L   7.15  L 





   (7.31-7.42)   (7.31-7.42) 


 


POC VBG pCO2  48 mmHg H mmHg  37 mmHg L mmHg





   (40-44)   (40-44) 


 


POC VBG pO2  35 mmHg mmHg  23 mmHg L mmHg





   (35-40)   (35-40) 


 


POC VBG HCO3  13 mEq/L L mEq/L  13 mEq/L L mEq/L





   (22-26)   (22-26) 


 


POC VBG Total CO2  15 mEq/L L mEq/L  14 mEq/L L mEq/L





   (21-27)   (21-27) 


 


POC VBG Base Excess  -18.0 mEq/L L mEq/L  -16.0 mEq/L L mEq/L





   (-2.5-2.5)   (-2.5-2.5) 


 


POC Mix VBG O2 Sat  46 % L %  28 % L %





   (65-75)   (65-75) 


 


POC Lactic Acid French  12.6 mmol/L H mmol/L  9.3 mmol/L H mmol/L





   (0.7-2.1)   (0.7-2.1) 








 ISTAT H&H











  12/15/18 12/15/18





  15:05 13:50


 


POC Hgb  8.8 gm/dL L gm/dL  15.3 gm/dL gm/dL





   (12.6-16.3)   (12.6-16.3) 


 


POC Hct  26 % L %  45 % %





   (38-47)   (38-47) 














General


Initial Vital Signs: 


 Initial Vital Signs











Temperature (C)  36.4 C   12/15/18 13:33


 


Heart Rate  90   12/15/18 13:33


 


Respiratory Rate  40 H  12/15/18 13:33


 


Blood Pressure  71/49 L  12/15/18 13:33


 


O2 Sat (%)  93   12/15/18 13:33








 











O2 Delivery Mode               Non-Rebreather Mask


 


O2 (L/minute)                  15














Allergies/Adverse Reactions: 


 





No Known Allergies Allergy (Verified 12/15/18 13:33)


 








Home Medications: 














 Medication  Instructions  Recorded


 


Tamoxifen Citrate 20 mg PO DAILY 18


 


Cholecalciferol Vit D3 [Vitamin D3 1,000 units PO DAILY 18





(*)]  


 


Herbals/Supplements -Info Only 1 ea PO DAILY 18


 


Acetaminophen [Tylenol 325mg (*)] 650 mg PO Q4HRS PRN  tab 18


 


Ibuprofen [Motrin (*)] 600 mg PO Q6HRS PRN  tab 18


 


Ibuprofen 600 mg PO Q8H PRN #90 tablet 18


 


LORazepam [Ativan] 1 mg PO DAILY PRN #30 tablet 18


 


morphINE IR [morphINE IR 15 mg (*)] 15 mg PO Q4HRS PRN #30 tab 18


 


morphINE SR [Ms Contin/Oramorph 15 15 mg PO BID #60 tab 18





mg (*)]  














Departure





- Departure


Disposition: 


Clinical Impression: 


 Cardiac arrest, Metastatic breast cancer





Referrals: 


Patient,NotPresent [Primary Care Provider] - As per Instructions


Report Scribed for: El Whyte


Report Scribed by: Alla Leon


Date of Report: 12/15/18


Time of Report: 15:48


Physician Review and Approval Statement: Portions of this note were transcribed 

by an ED scribe.  I personally performed the history, physical exam, and 

medical decision making; and confirm the accuracy of the information in the 

transcribed note.

## 2018-12-15 NOTE — ASMTCMCOM
CM Note

 

CM Note                       

Notes:

Pt presented to the ED via EMS from home for AMS. 



Pt was recently discharged home 18 from [facility] w/[facility] and [facility]; pt had been 

admitted for recurrence of her breast cancer w/bone, brain, lung mets. 



Pt's MDPOA and sister Erika Velazco ([phone]) and pt's longtime boyfriend Jeevan Lopez 

([phone]) and pt's longtime friend, Teri, were present in the ED. 



Pt's conditioned worsened and required intubation. Pt then became pulseless and CPR was initiated. 



After extensive resuscitative efforts and discussion w/pt's family, it was decided to stop CPR. Pt 

 in the ED. This CM provided emotional support to pt's family (including pt's 

mother, Simin, who later arrived from [facility]) throughout the ED visit and also assisted 

w/mortuary selection process.    



This CM called and notified [facility]. This CM will contact [facility] tomorrow. 





CM available for further assistance if needed.



 

 

Date Signed:  12/15/2018 07:43 PM

Electronically Signed By:Erika Anderson RN

## 2018-12-16 NOTE — ASDISCHSUM
----------------------------------------------

Discharge Information

----------------------------------------------

Plan Status:                                         Medically Cleared to Leave:

Discharge Date:12/15/2018 06:21 PM                   CM D/C Disposition:

ADT D/C Disposition:                          Projected Discharge Date:12/15/2018 06:21 PM

Transportation at D/C:                               Discharge Delay Reason:

Follow-Up Date:12/15/2018 06:21 PM                   Discharge Slot:

Final Diagnosis:

----------------------------------------------

Placement Information

----------------------------------------------

----------------------------------------------

Patient Contact Information

----------------------------------------------

Contact Name:SAIMA                             Relationship:Sister

Address:                                             Home [phone]

                                                     Work Phone:

City:[place]                                          Alternate Phone:

[facility] Code:[place]                                    Email:

----------------------------------------------

Financial Information

----------------------------------------------

[facility]

[facility]       Primary Plan Number:[id number]

Secondary Plan Desc:                                 Secondary Plan Number:

 

 

----------------------------------------------

Assessment Information

----------------------------------------------

----------------------------------------------

[facility] CM Progress Note

----------------------------------------------

CM Note

 

CM Note                       

Notes:

Pt presented to the ED via EMS from home for AMS. 



Pt was recently discharged home 18 from [facility] w/[facility] and [facility]; pt had been 

admitted for recurrence of her breast cancer w/bone, brain, lung mets. 



Pt's MDPOA and sister Erika Velazco ([phone]) and pt's longtime boyfriend Jeevan Lopez 

([phone]) and pt's longtime friend, Teri, were present in the ED. 



Pt's conditioned worsened and required intubation. Pt then became pulseless and CPR was initiated. 



After extensive resuscitative efforts and discussion w/pt's family, it was decided to stop CPR. Pt 

 in the ED. This CM provided emotional support to pt's family (including pt's 

mother, Simin, who later arrived from [facility]) throughout the ED visit and also assisted 

w/mortuary selection process.    



This CM called and notified [facility]. This CM will contact [facility] tomorrow. 





CM available for further assistance if needed.



 

 

Date Signed:  12/15/2018 07:43 PM

Electronically Signed By:Erika Anderson RN

 

 

----------------------------------------------

Intervention Information

----------------------------------------------

Intervention Type:Emotional Support                  Date of Service:12/15/2018 07:04 PM

Patient Type:Emergency Room                          Staff Member:FIONA Anderson Sharon

Hours:1.5                                            Discipline:

Severity:                                            Comment:Various emotional support, assistance 
and 

                                                              guidance provided. Offered twice to ha
ve 

                                                              a Spiritual Services team member come 
to 

                                                              the ED and provide additional support 
to 

                                                              family; pt's family respectfully 

                                                              declined both times. 

Intervention Type:Post Acute Communication           Date of Service:12/15/2018 07:40 PM

Patient Type:Emergency Room                          Staff Member:FIONA Anderson Sharon

Hours:0.25                                           Discipline:

Severity:                                            Comment:Contacted [facility] and 
notified 

                                                              them of pt's death. Family requested 

                                                              that [facility] not reach out to them. 

Intervention Type:End of Life                        Date of Service:12/15/2018 07:00 PM

Patient Type:Emergency Room                          Staff Member:FIONA Anderson Sharon

Hours:1                                              Discipline:

Severity:                                            Comment:Various end of life assistance (review
ing pt's 

                                                              living will, last testament, MDPOA 

                                                              paperwork w/family and then assisting 


                                                              w/ mortuary selection process, etc.)

## 2018-12-17 NOTE — CPEKG
Test Reason : OPEN

Blood Pressure : ***/*** mmHG

Vent. Rate : 070 BPM     Atrial Rate : 070 BPM

   P-R Int : 228 ms          QRS Dur : 108 ms

    QT Int : 388 ms       P-R-T Axes : 100 109 002 degrees

   QTc Int : 419 ms

 

Right and left arm electrode reversal, interpretation assumes no reversal

Sinus rhythm

Prolonged AL interval

Right axis deviation

Borderline ST depression, diffuse leads

 

Confirmed by El Whyte ([id number]) on 12/17/2018 10:34:15 AM

 

Referred By:             Confirmed By:El Whyte

## 2018-12-17 NOTE — CPEKG
Test Reason : OPEN

Blood Pressure : ***/*** mmHG

Vent. Rate : 085 BPM     Atrial Rate : 085 BPM

   P-R Int : 124 ms          QRS Dur : 092 ms

    QT Int : 394 ms       P-R-T Axes : 082 103 015 degrees

   QTc Int : 469 ms

 

Sinus rhythm

Right axis deviation

Repol abnrm, prob ischemia, anterolateral lds

 

Confirmed by El Whyte ([id number]) on 12/17/2018 10:34:08 AM

 

Referred By:             Confirmed By:El Whyte

## 2022-03-17 NOTE — ASMTCMCOM
CM Note

 

CM Note                       

Notes:

Pt is single and has boyfriend Jeevan and sister Erika who are supportive. Chart reviewed in 

rounds.  Pt admitted for femoral fracture due to extensive metastatic Breast CA after several years 


of remission and double mastectomy in 2012. Oncologist is recommending pt to orthopedic oncologist 

at [facility], however extent of disease would make recovery from surgery grim. 



 Plan is now to manage pain and mobilize pt but if this is not possible, transfer her to [facility] for 

further evaluation. Camille Bill, navigator called to consult with pt. Therapies have yet to 

evaluate. Discharge plan TBD. 

 

Date Signed:  11/29/2018 05:05 PM

Electronically Signed By:Oksana Olivera Metronidazole Counseling:  I discussed with the patient the risks of metronidazole including but not limited to seizures, nausea/vomiting, a metallic taste in the mouth, nausea/vomiting and severe allergy.